# Patient Record
Sex: MALE | Race: WHITE | Employment: FULL TIME | URBAN - METROPOLITAN AREA
[De-identification: names, ages, dates, MRNs, and addresses within clinical notes are randomized per-mention and may not be internally consistent; named-entity substitution may affect disease eponyms.]

---

## 2017-01-02 ENCOUNTER — TELEPHONE (OUTPATIENT)
Dept: UROLOGY | Facility: CLINIC | Age: 32
End: 2017-01-02

## 2017-01-02 DIAGNOSIS — N41.1 PROSTATITIS, CHRONIC: Primary | ICD-10-CM

## 2017-01-02 RX ORDER — NITROFURANTOIN 25; 75 MG/1; MG/1
100 CAPSULE ORAL 2 TIMES DAILY
Qty: 84 CAPSULE | Refills: 0 | Status: SHIPPED | OUTPATIENT
Start: 2017-01-02 | End: 2017-02-13

## 2017-01-02 NOTE — TELEPHONE ENCOUNTER
Called patient to discuss semen culture results which indicate moderate growth of enterococcus. Limited susceptibilities to amoxicillin, ampicillin, gentamicin, vancomycin. Patient has a penicillin allergy listed and other options are not available PO. Per current antibiograms, enterococcus demonstrates 90% susceptibility to nitrofurantoin. As such, will treat with Macrobid 100 mg PO BID x 6 weeks to treat this chronic prostatitis infection. Rx sent to patient's pharmacy and he was notified via phone. Continues to complain of significant dysuria. He may continue to use Azo PRN but discussed that starting antibiotics promptly will provide the best relief. He verbalized understanding and all other questions were answered. Warning signs/ER precautions discussed. He will call with any worsening symptoms or symptoms that fail to improve after adequately targeted antibiotic therapy.  Teresa Nj PA-C  Urology

## 2017-03-23 ENCOUNTER — OFFICE VISIT (OUTPATIENT)
Dept: INTERNAL MEDICINE | Facility: CLINIC | Age: 32
End: 2017-03-23
Payer: COMMERCIAL

## 2017-03-23 VITALS
TEMPERATURE: 98.8 F | SYSTOLIC BLOOD PRESSURE: 112 MMHG | HEIGHT: 74 IN | HEART RATE: 68 BPM | DIASTOLIC BLOOD PRESSURE: 70 MMHG | BODY MASS INDEX: 22.19 KG/M2 | WEIGHT: 172.9 LBS | OXYGEN SATURATION: 100 %

## 2017-03-23 DIAGNOSIS — Z87.438 HISTORY OF PROSTATITIS: ICD-10-CM

## 2017-03-23 DIAGNOSIS — J06.9 UPPER RESPIRATORY TRACT INFECTION, UNSPECIFIED TYPE: Primary | ICD-10-CM

## 2017-03-23 DIAGNOSIS — H69.93 DYSFUNCTION OF EUSTACHIAN TUBE, BILATERAL: ICD-10-CM

## 2017-03-23 PROCEDURE — 99213 OFFICE O/P EST LOW 20 MIN: CPT | Performed by: FAMILY MEDICINE

## 2017-03-23 RX ORDER — CEPHALEXIN 500 MG/1
500 CAPSULE ORAL 3 TIMES DAILY
Qty: 21 CAPSULE | Refills: 0 | Status: SHIPPED | OUTPATIENT
Start: 2017-03-23 | End: 2017-03-31

## 2017-03-23 NOTE — PATIENT INSTRUCTIONS
Take prescribed medication as directed.    Try generic Claritin D, Allegra D or Zyrtec D. Ask Pharmacist for these.

## 2017-03-23 NOTE — MR AVS SNAPSHOT
After Visit Summary   3/23/2017    Abebe Elias    MRN: [id number]           Patient Information     Date Of Birth          1985        Visit Information        Provider Department      3/23/2017 8:00 AM Keyshawn Silverio MD [facility]        Today's Diagnoses     Upper respiratory tract infection, unspecified type    -  1    Dysfunction of eustachian tube, bilateral          Care Instructions      Take prescribed medication as directed.    Try generic Claritin D, Allegra D or Zyrtec D. Ask Pharmacist for these.          Follow-ups after your visit        Who to contact     If you have questions or need follow up information about today's clinic visit or your schedule please contact [facility] directly at [phone].  Normal or non-critical lab and imaging results will be communicated to you by [employer], letter or phone within 4 business days after the clinic has received the results. If you do not hear from us within 7 days, please contact the clinic through [employer] or phone. If you have a critical or abnormal lab result, we will notify you by phone as soon as possible.  Submit refill requests through [employer] or call your pharmacy and they will forward the refill request to us. Please allow 3 business days for your refill to be completed.          Additional Information About Your Visit        MyChart Information     [employer] gives you secure access to your electronic health record. If you see a primary care provider, you can also send messages to your care team and make appointments. If you have questions, please call your primary care clinic.  If you do not have a primary care provider, please call [phone] and they will assist you.        Care EveryWhere ID     This is your Care EveryWhere ID. This could be used by other organizations to access your [facility] medical records  [id number]        Your Vitals Were     Pulse Temperature Height Pulse  "Oximetry BMI (Body Mass Index)       68 98.8  F (37.1  C) (Oral) 6' 2\" (1.88 m) 100% 22.2 kg/m2        Blood Pressure from Last 3 Encounters:   03/23/17 112/70   12/27/16 124/76   11/11/16 100/60    Weight from Last 3 Encounters:   03/23/17 172 lb 14.4 oz (78.4 kg)   12/27/16 159 lb (72.1 kg)   11/11/16 160 lb (72.6 kg)              Today, you had the following     No orders found for display         Today's Medication Changes          These changes are accurate as of: 3/23/17  8:16 AM.  If you have any questions, ask your nurse or doctor.               Start taking these medicines.        Dose/Directions    cephALEXin 500 MG capsule   Commonly known as:  KEFLEX   Used for:  Upper respiratory tract infection, unspecified type   Started by:  Keyshawn Silverio MD        Dose:  500 mg   Take 1 capsule (500 mg) by mouth 3 times daily   Quantity:  21 capsule   Refills:  0            Where to get your medicines      These medications were sent to [facility] [id number] [place] [address]  AT [address] [address]     Phone:  [phone]     cephALEXin 500 MG capsule                Primary Care Provider Fax #    [facility] [phone]       No address on file        Thank you!     Thank you for choosing [facility]  for your care. Our goal is always to provide you with excellent care. Hearing back from our patients is one way we can continue to improve our services. Please take a few minutes to complete the written survey that you may receive in the mail after your visit with us. Thank you!             Your Updated Medication List - Protect others around you: Learn how to safely use, store and throw away your medicines at www.disposemymeds.org.          This list is accurate as of: 3/23/17  8:16 AM.  Always use your most recent med list.                   Brand Name Dispense Instructions for use    ADVIL PO          " cephALEXin 500 MG capsule    KEFLEX    21 capsule    Take 1 capsule (500 mg) by mouth 3 times daily

## 2017-03-23 NOTE — NURSING NOTE
"Chief Complaint   Patient presents with     Sinus Problem     Ear Problem       Initial /70 (BP Location: Left arm, Patient Position: Chair, Cuff Size: Adult Large)  Pulse 68  Temp 98.8  F (37.1  C) (Oral)  Ht 6' 2\" (1.88 m)  Wt 172 lb 14.4 oz (78.4 kg)  SpO2 100%  BMI 22.2 kg/m2 Estimated body mass index is 22.2 kg/(m^2) as calculated from the following:    Height as of this encounter: 6' 2\" (1.88 m).    Weight as of this encounter: 172 lb 14.4 oz (78.4 kg).  Medication Reconciliation: complete    "

## 2017-03-31 ENCOUNTER — OFFICE VISIT (OUTPATIENT)
Dept: UROLOGY | Facility: CLINIC | Age: 32
End: 2017-03-31
Payer: COMMERCIAL

## 2017-03-31 VITALS
WEIGHT: 170 LBS | DIASTOLIC BLOOD PRESSURE: 72 MMHG | HEIGHT: 74 IN | SYSTOLIC BLOOD PRESSURE: 122 MMHG | HEART RATE: 70 BPM | BODY MASS INDEX: 21.82 KG/M2

## 2017-03-31 DIAGNOSIS — R10.2 PERINEAL PAIN IN MALE: ICD-10-CM

## 2017-03-31 DIAGNOSIS — N41.0 ACUTE PROSTATITIS: ICD-10-CM

## 2017-03-31 DIAGNOSIS — R30.0 DYSURIA: Primary | ICD-10-CM

## 2017-03-31 DIAGNOSIS — Z87.438 HISTORY OF PROSTATITIS: ICD-10-CM

## 2017-03-31 LAB
ALBUMIN UR-MCNC: 30 MG/DL
APPEARANCE UR: CLEAR
BILIRUB UR QL STRIP: ABNORMAL
COLOR UR AUTO: YELLOW
GLUCOSE UR STRIP-MCNC: NEGATIVE MG/DL
HGB UR QL STRIP: NEGATIVE
KETONES UR STRIP-MCNC: NEGATIVE MG/DL
LEUKOCYTE ESTERASE UR QL STRIP: NEGATIVE
NITRATE UR QL: NEGATIVE
PH UR STRIP: 5.5 PH (ref 5–7)
SP GR UR STRIP: >1.03 (ref 1–1.03)
URN SPEC COLLECT METH UR: ABNORMAL
UROBILINOGEN UR STRIP-ACNC: 0.2 EU/DL (ref 0.2–1)

## 2017-03-31 PROCEDURE — 81003 URINALYSIS AUTO W/O SCOPE: CPT | Performed by: PHYSICIAN ASSISTANT

## 2017-03-31 PROCEDURE — 99213 OFFICE O/P EST LOW 20 MIN: CPT | Performed by: PHYSICIAN ASSISTANT

## 2017-03-31 RX ORDER — CIPROFLOXACIN 500 MG/1
500 TABLET, FILM COATED ORAL 2 TIMES DAILY
Qty: 60 TABLET | Refills: 1 | Status: SHIPPED | OUTPATIENT
Start: 2017-03-31 | End: 2017-04-30

## 2017-03-31 RX ORDER — PHENAZOPYRIDINE HYDROCHLORIDE 100 MG/1
100 TABLET, FILM COATED ORAL 3 TIMES DAILY PRN
Qty: 24 TABLET | Refills: 0 | Status: SHIPPED | OUTPATIENT
Start: 2017-03-31 | End: 2017-04-13

## 2017-03-31 RX ORDER — OXYCODONE AND ACETAMINOPHEN 5; 325 MG/1; MG/1
TABLET ORAL
Refills: 0 | COMMUNITY
Start: 2016-11-04 | End: 2017-03-31

## 2017-03-31 RX ORDER — OXYCODONE AND ACETAMINOPHEN 5; 325 MG/1; MG/1
1 TABLET ORAL EVERY 6 HOURS PRN
Qty: 15 TABLET | Refills: 0 | Status: SHIPPED | OUTPATIENT
Start: 2017-03-31 | End: 2018-10-08

## 2017-03-31 ASSESSMENT — PAIN SCALES - GENERAL: PAINLEVEL: SEVERE PAIN (6)

## 2017-03-31 NOTE — MR AVS SNAPSHOT
After Visit Summary   3/31/2017    Abebe Elias    MRN: [id number]           Patient Information     Date Of Birth          1985        Visit Information        Provider Department      3/31/2017 8:30 AM Teresa Henson PA-C [facility]        Today's Diagnoses     History of prostatitis    -  1    Acute prostatitis        Dysuria        Perineal pain in male           Follow-ups after your visit        Your next 10 appointments already scheduled     Apr 13, 2017  8:30 AM CDT   Cystoscopy with Krzysztof Martin MD, [facility] [place] ([facility])    [address]  [address]  [facility] [address] [phone]              Future tests that were ordered for you today     Open Future Orders        Priority Expected Expires Ordered    Genital Culture Aerobic Bacterial [[id number]] Routine  3/31/2018 3/31/2017            Who to contact     If you have questions or need follow up information about today's clinic visit or your schedule please contact [facility] directly at [phone].  Normal or non-critical lab and imaging results will be communicated to you by [employer], letter or phone within 4 business days after the clinic has received the results. If you do not hear from us within 7 days, please contact the clinic through [employer] or phone. If you have a critical or abnormal lab result, we will notify you by phone as soon as possible.  Submit refill requests through [employer] or call your pharmacy and they will forward the refill request to us. Please allow 3 business days for your refill to be completed.          Additional Information About Your Visit        [employer] Information     [employer] gives you secure access to your electronic health record. If you see a primary care provider, you can also send messages to your care team and make appointments.  "If you have questions, please call your primary care clinic.  If you do not have a primary care provider, please call [phone] and they will assist you.        Care EveryWhere ID     This is your Care EveryWhere ID. This could be used by other organizations to access your [facility] medical records  [id number]        Your Vitals Were     Pulse Height BMI (Body Mass Index)             70 1.88 m (6' 2\") 21.83 kg/m2          Blood Pressure from Last 3 Encounters:   03/31/17 122/72   03/23/17 112/70   12/27/16 124/76    Weight from Last 3 Encounters:   03/31/17 77.1 kg (170 lb)   03/23/17 78.4 kg (172 lb 14.4 oz)   12/27/16 72.1 kg (159 lb)              We Performed the Following     UA without Microscopic          Today's Medication Changes          These changes are accurate as of: 3/31/17  9:09 AM.  If you have any questions, ask your nurse or doctor.               Start taking these medicines.        Dose/Directions    ciprofloxacin 500 MG tablet   Commonly known as:  CIPRO   Used for:  Acute prostatitis   Started by:  Teresa Henson PA-C        Dose:  500 mg   Take 1 tablet (500 mg) by mouth 2 times daily   Quantity:  60 tablet   Refills:  1       oxyCODONE-acetaminophen 5-325 MG per tablet   Commonly known as:  PERCOCET   Used for:  Perineal pain in male   Started by:  Teresa Henson PA-C        Dose:  1 tablet   Take 1 tablet by mouth every 6 hours as needed for moderate to severe pain   Quantity:  15 tablet   Refills:  0       phenazopyridine 100 MG tablet   Commonly known as:  PYRIDIUM   Used for:  Dysuria   Started by:  Teresa Henson PA-C        Dose:  100 mg   Take 1 tablet (100 mg) by mouth 3 times daily as needed for urinary tract discomfort   Quantity:  24 tablet   Refills:  0            Where to get your medicines      These medications were sent to [facility] [address] [place], [place] - [address] AT [facility] [address]  [address], [facility] " [address]     Phone:  [phone]     ciprofloxacin 500 MG tablet    phenazopyridine 100 MG tablet         Some of these will need a paper prescription and others can be bought over the counter.  Ask your nurse if you have questions.     Bring a paper prescription for each of these medications     oxyCODONE-acetaminophen 5-325 MG per tablet                Primary Care Provider Fax #    Yifan Candelario [phone]       No address on file        Thank you!     Thank you for choosing [facility] [place]  for your care. Our goal is always to provide you with excellent care. Hearing back from our patients is one way we can continue to improve our services. Please take a few minutes to complete the written survey that you may receive in the mail after your visit with us. Thank you!             Your Updated Medication List - Protect others around you: Learn how to safely use, store and throw away your medicines at www.disposemymeds.org.          This list is accurate as of: 3/31/17  9:09 AM.  Always use your most recent med list.                   Brand Name Dispense Instructions for use    ADVIL PO          ciprofloxacin 500 MG tablet    CIPRO    60 tablet    Take 1 tablet (500 mg) by mouth 2 times daily       oxyCODONE-acetaminophen 5-325 MG per tablet    PERCOCET    15 tablet    Take 1 tablet by mouth every 6 hours as needed for moderate to severe pain       phenazopyridine 100 MG tablet    PYRIDIUM    24 tablet    Take 1 tablet (100 mg) by mouth 3 times daily as needed for urinary tract discomfort

## 2017-03-31 NOTE — PROGRESS NOTES
"REASON FOR VISIT: follow up dysuria, scrotal pain     HPI: Mr. Abebe Elias is a 31 year old male seen today in the urology clinic for follow up regarding dysuria and scrotal pain. The patient initially saw Dr. Martin in March 2016 at which time he was diagnosed with prostatitis and treated with 4 weeks of ciprofloxacin. Patient notes that symptoms improved somewhat but never fully resolved. He has eliminated caffeine, juice, and acidic foods/drinks from his diet without much change. Since then, the patient has been seen multiple times (both in the clinic and ER) for ongoing dysuria, pelvic pain, and rectal pain. Has been treated with multiple courses of antibiotics including Rocephin IM, Doxycycline, Cipro, Bactrim, and Macrobid. He notes that symptoms improve somewhat while on antibiotics but then recur within days to weeks after stopping them. Has had multiple negative imaging studies as well including CT abdomen/pelvis and scrotal ultrasound. Recent semen culture did reveal moderate growth of Enterococcus species. Most recently treated with Macrobid and reports symptoms improved significantly for a while, but 2 weeks ago they recurred after he had some dental work done.     Currently complains of severe dysuria, frequency, urgency, perineal pain/pressure, pain in the lower abdomen, and bilateral testicular pain. Symptoms seem to worsen after intercourse. Denies fevers, chills, N/V. Denies gross hematuria, hematospermia, pyuria, or penile discharge. Takes ibuprofen regularly but has been taking Norco to help him sleep at night as the pain is so severe at times.     PHYSICAL EXAM:    /72 (BP Location: Left arm, Patient Position: Chair, Cuff Size: Adult Regular)  Pulse 70  Ht 1.88 m (6' 2\")  Wt 77.1 kg (170 lb)  BMI 21.83 kg/m2  GENERAL: Well groomed, well developed, well nourished male in NAD.  HEENT: EOMI, AT, NC.  SKIN: Warm to touch, dry.  No visible rashes or lesions on examined areas.  RESP: " No increased respiratory effort.    MS: Full ROM in extremities.  : Deferred - see exam section from previous office visit.   SANTO: Deferred - see exam section from previous office visit.   NEURO: Alert and oriented x 3.  PSYCH: Normal mood and affect, pleasant and agreeable during interview and exam.     IMAGING:    TESTICULAR ULTRASOUND December 27, 2016 12:15 PM   FINDINGS: A few scattered calcifications are noted in each testicle.  There is otherwise homogeneous normal echotexture throughout the  bilateral testes. The left testicle measures 4.5 x 2.7 x 2.2 cm. The  right testicle measures 4.8 x 2.8 x 2.5 cm. Both the right and left  epididymides are unremarkable. Doppler evaluation shows normal  arterial waveforms to the testes bilaterally. There is no hydrocele.  There is no varicocele. There is no mass or abnormal calcifications.  IMPRESSION: Negative study.     Urine dipstick today reveals 30 protein, small bilirubin, o/w wnl.     Component Results   Component Collected Lab   Specimen Description 12/29/2016  8:00 AM [facility]   Semen   Culture Micro (Abnormal) 12/29/2016  8:00 AM 75   Moderate growth Enterococcus species   Moderate growth Normal urogenital esvin      Micro Report Status 12/29/2016  8:00 AM 75   FINAL 01/01/2017        ASSESSMENT/PLAN:  Mr. Abebe Elias is a very pleasant 31 year old male with cyclic episodes of severe dysuria, perineal pain/pressure, testicular pain, frequency/urgency - ongoing for 1 year. Has had extensive workup including scrotal ultrasound x 2, CT A/P, STD screening, UA/UC, and semen culture which did reveal moderate growth enterococcus. His symptoms improve temporarily with antibiotics but always recur after completion - has been treated multiple times for suspected prostatitis versus urethritis. Given the recurrent nature and the severity of his symptoms, would recommend proceeding with additional evaluation to include the following:  -Recheck semen culture to confirm  continued presence or eradication of enterococcus.  -Cystoscopy with Dr. Martin who the patient last saw 4/2016 to rule out urethral (stricture) or intravesical pathology.   -Will also start on another course of Cipro 500 mg BID x 4 weeks as he does improve with antibiotics. Await semen culture results and adjust antibiotic coverage as needed.  -Take a daily probiotic to prevent disruption to healthy GI esvin.  -Rx for short courses of Pyridium and Norco (#15) provided. Use sparingly and only as needed.   -Continue to avoid bladder irritants, drink plenty of water, and avoid aggravating activities.     Follow up for cystoscopy as scheduled. Call or RTC sooner with any issues. Warning signs and ER precautions discussed including fevers, chills, N/V, severe, refractory pain, penile discharge, etc. Patient verbalized his understanding and agreement.      I have enjoyed participating in the medical care of this very pleasant patient.  Please don't hesitate to contact me with any questions or concerns.      SYDNEY BarreraC  Urology

## 2017-03-31 NOTE — LETTER
3/31/2017       RE: Abebe Elias  [address]  [facility] [address]     Dear Colleague,    Thank you for referring your patient, Abebe Elias, to the [facility] [place] at [facility]. Please see a copy of my visit note below.    REASON FOR VISIT: follow up dysuria, scrotal pain     HPI: Mr. Abebe Elias is a 31 year old male seen today in the urology clinic for follow up regarding dysuria and scrotal pain. The patient initially saw Dr. Martin in March 2016 at which time he was diagnosed with prostatitis and treated with 4 weeks of ciprofloxacin. Patient notes that symptoms improved somewhat but never fully resolved. He has eliminated caffeine, juice, and acidic foods/drinks from his diet without much change. Since then, the patient has been seen multiple times (both in the clinic and ER) for ongoing dysuria, pelvic pain, and rectal pain. Has been treated with multiple courses of antibiotics including Rocephin IM, Doxycycline, Cipro, Bactrim, and Macrobid. He notes that symptoms improve somewhat while on antibiotics but then recur within days to weeks after stopping them. Has had multiple negative imaging studies as well including CT abdomen/pelvis and scrotal ultrasound. Recent semen culture did reveal moderate growth of Enterococcus species. Most recently treated with Macrobid and reports symptoms improved significantly for a while, but 2 weeks ago they recurred after he had some dental work done.     Currently complains of severe dysuria, frequency, urgency, perineal pain/pressure, pain in the lower abdomen, and bilateral testicular pain. Symptoms seem to worsen after intercourse. Denies fevers, chills, N/V. Denies gross hematuria, hematospermia, pyuria, or penile discharge. Takes ibuprofen regularly but has been taking Norco to help him sleep at night as the pain is so severe at times.     PHYSICAL EXAM:    /72 (BP  "Location: Left arm, Patient Position: Chair, Cuff Size: Adult Regular)  Pulse 70  Ht 1.88 m (6' 2\")  Wt 77.1 kg (170 lb)  BMI 21.83 kg/m2  GENERAL: Well groomed, well developed, well nourished male in NAD.  HEENT: EOMI, AT, NC.  SKIN: Warm to touch, dry.  No visible rashes or lesions on examined areas.  RESP: No increased respiratory effort.    MS: Full ROM in extremities.  : Deferred - see exam section from previous office visit.   SANTO: Deferred - see exam section from previous office visit.   NEURO: Alert and oriented x 3.  PSYCH: Normal mood and affect, pleasant and agreeable during interview and exam.     IMAGING:    TESTICULAR ULTRASOUND December 27, 2016 12:15 PM   FINDINGS: A few scattered calcifications are noted in each testicle.  There is otherwise homogeneous normal echotexture throughout the  bilateral testes. The left testicle measures 4.5 x 2.7 x 2.2 cm. The  right testicle measures 4.8 x 2.8 x 2.5 cm. Both the right and left  epididymides are unremarkable. Doppler evaluation shows normal  arterial waveforms to the testes bilaterally. There is no hydrocele.  There is no varicocele. There is no mass or abnormal calcifications.  IMPRESSION: Negative study.     Urine dipstick today reveals 30 protein, small bilirubin, o/w wnl.     Component Results   Component Collected Lab   Specimen Description 12/29/2016  8:00 AM [facility]   Semen   Culture Micro (Abnormal) 12/29/2016  8:00 AM 75   Moderate growth Enterococcus species   Moderate growth Normal urogenital esvin      Micro Report Status 12/29/2016  8:00 AM 75   FINAL 01/01/2017        ASSESSMENT/PLAN:  Mr. Abebe Elias is a very pleasant 31 year old male with cyclic episodes of severe dysuria, perineal pain/pressure, testicular pain, frequency/urgency - ongoing for 1 year. Has had extensive workup including scrotal ultrasound x 2, CT A/P, STD screening, UA/UC, and semen culture which did reveal moderate growth enterococcus. His symptoms improve " temporarily with antibiotics but always recur after completion - has been treated multiple times for suspected prostatitis versus urethritis. Given the recurrent nature and the severity of his symptoms, would recommend proceeding with additional evaluation to include the following:  -Recheck semen culture to confirm continued presence or eradication of enterococcus.  -Cystoscopy with Dr. Martin who the patient last saw 4/2016 to rule out urethral (stricture) or intravesical pathology.   -Will also start on another course of Cipro 500 mg BID x 4 weeks as he does improve with antibiotics. Await semen culture results and adjust antibiotic coverage as needed.  -Take a daily probiotic to prevent disruption to healthy GI esvin.  -Rx for short courses of Pyridium and Norco (#15) provided. Use sparingly and only as needed.   -Continue to avoid bladder irritants, drink plenty of water, and avoid aggravating activities.     Follow up for cystoscopy as scheduled. Call or RTC sooner with any issues. Warning signs and ER precautions discussed including fevers, chills, N/V, severe, refractory pain, penile discharge, etc. Patient verbalized his understanding and agreement.      I have enjoyed participating in the medical care of this very pleasant patient.  Please don't hesitate to contact me with any questions or concerns.      Teresa Henson PA-C  Urology

## 2017-04-12 DIAGNOSIS — Z87.438 HISTORY OF PROSTATITIS: Primary | ICD-10-CM

## 2017-04-13 ENCOUNTER — OFFICE VISIT (OUTPATIENT)
Dept: UROLOGY | Facility: CLINIC | Age: 32
End: 2017-04-13
Payer: COMMERCIAL

## 2017-04-13 VITALS
HEART RATE: 60 BPM | SYSTOLIC BLOOD PRESSURE: 118 MMHG | DIASTOLIC BLOOD PRESSURE: 68 MMHG | HEIGHT: 74 IN | BODY MASS INDEX: 22.07 KG/M2 | WEIGHT: 172 LBS

## 2017-04-13 DIAGNOSIS — Z87.438 HISTORY OF PROSTATITIS: ICD-10-CM

## 2017-04-13 LAB
ALBUMIN UR-MCNC: NEGATIVE MG/DL
APPEARANCE UR: CLEAR
BILIRUB UR QL STRIP: NEGATIVE
COLOR UR AUTO: YELLOW
GLUCOSE UR STRIP-MCNC: NEGATIVE MG/DL
HGB UR QL STRIP: NEGATIVE
KETONES UR STRIP-MCNC: NEGATIVE MG/DL
LEUKOCYTE ESTERASE UR QL STRIP: NEGATIVE
NITRATE UR QL: NEGATIVE
PH UR STRIP: 7 PH (ref 5–7)
SP GR UR STRIP: 1.02 (ref 1–1.03)
URN SPEC COLLECT METH UR: NORMAL
UROBILINOGEN UR STRIP-ACNC: 1 EU/DL (ref 0.2–1)

## 2017-04-13 PROCEDURE — 81003 URINALYSIS AUTO W/O SCOPE: CPT | Performed by: UROLOGY

## 2017-04-13 PROCEDURE — 99212 OFFICE O/P EST SF 10 MIN: CPT | Mod: 25 | Performed by: UROLOGY

## 2017-04-13 PROCEDURE — 52000 CYSTOURETHROSCOPY: CPT | Performed by: UROLOGY

## 2017-04-13 RX ORDER — DOXYCYCLINE 100 MG/1
100 TABLET ORAL 2 TIMES DAILY
Qty: 168 TABLET | Refills: 0 | Status: SHIPPED | OUTPATIENT
Start: 2017-04-13 | End: 2017-07-06

## 2017-04-13 ASSESSMENT — PAIN SCALES - GENERAL: PAINLEVEL: MODERATE PAIN (4)

## 2017-04-13 NOTE — MR AVS SNAPSHOT
"              After Visit Summary   4/13/2017    Abebe Elias    MRN: [id number]           Patient Information     Date Of Birth          1985        Visit Information        Provider Department      4/13/2017 8:30 AM Krzysztof Martin MD; [facility] [place]        Today's Diagnoses     History of prostatitis          Care Instructions         AFTER YOUR CYSTOSCOPY         You have just completed a cystoscopy, or \"cysto\", which allowed your physician to learn more about your bladder (or to remove a stent placed after surgery). We suggest that you continue to avoid caffeine, fruit juice, and alcohol for the next 24 hours, however, you are encouraged to return to your normal activities.       A few things that are considered normal after your cystoscopy:    * small amount of bleeding (or spotting) that clears within the next 24 hours    * slight burning sensation with urination    * sensation to of needing to avoid more frequently    * the feeling of \"air\" in your urine    * mild discomfort that is relieved with Tylonol        Please contact our office promptly if you:    * develop a fever above 101 degrees    * are unable to urinate    * develop bright red blood that does not stop    * severe pain or swelling        And of course, please contact our office with any concerns or questions [phone]            Follow-ups after your visit        Who to contact     If you have questions or need follow up information about today's clinic visit or your schedule please contact [facility] [place] directly at [phone].  Normal or non-critical lab and imaging results will be communicated to you by MyChart, letter or phone within 4 business days after the clinic has received the results. If you do not hear from us within 7 days, please contact the clinic through MyChart or phone. If you have a critical or abnormal lab result, we will " "notify you by phone as soon as possible.  Submit refill requests through [employer] or call your pharmacy and they will forward the refill request to us. Please allow 3 business days for your refill to be completed.          Additional Information About Your Visit        [employer] Information     [employer] gives you secure access to your electronic health record. If you see a primary care provider, you can also send messages to your care team and make appointments. If you have questions, please call your primary care clinic.  If you do not have a primary care provider, please call [phone] and they will assist you.        Care EveryWhere ID     This is your Care EveryWhere ID. This could be used by other organizations to access your [place] medical records  [id number]        Your Vitals Were     Pulse Height BMI (Body Mass Index)             60 1.88 m (6' 2\") 22.08 kg/m2          Blood Pressure from Last 3 Encounters:   04/13/17 118/68   03/31/17 122/72   03/23/17 112/70    Weight from Last 3 Encounters:   04/13/17 78 kg (172 lb)   03/31/17 77.1 kg (170 lb)   03/23/17 78.4 kg (172 lb 14.4 oz)              We Performed the Following     UA without Microscopic        Primary Care Provider Fax #    [facility] [phone]       No address on file        Thank you!     Thank you for choosing [facility] [place]  for your care. Our goal is always to provide you with excellent care. Hearing back from our patients is one way we can continue to improve our services. Please take a few minutes to complete the written survey that you may receive in the mail after your visit with us. Thank you!             Your Updated Medication List - Protect others around you: Learn how to safely use, store and throw away your medicines at www.disposemymeds.org.          This list is accurate as of: 4/13/17  9:34 AM.  Always use your most recent med list.                   Brand Name Dispense " Instructions for use    ADVIL PO          ciprofloxacin 500 MG tablet    CIPRO    60 tablet    Take 1 tablet (500 mg) by mouth 2 times daily       oxyCODONE-acetaminophen 5-325 MG per tablet    PERCOCET    15 tablet    Take 1 tablet by mouth every 6 hours as needed for moderate to severe pain

## 2017-04-13 NOTE — PROGRESS NOTES
History: This very pleasant 31-year-old gentleman returns today.  He has a history of recurrent prostatitis with epididymitis and currently is on Cipro 500 mg twice a day for recurrent symptoms.  Semen culture did show a moderate growth of enterococcus about 3 months ago sensitive to penicillin that he is allergic to penicillin.  The main symptom of present is discomfort in the scrotum.  He was seen by our nurse practitioner in the recent past was concerned that he may require an additional cystoscopy.  He is getting some discomfort on voiding the present time.    Procedure.  Cystoscopy  Surgeon.    Veronica  Anesthesia.  Local anesthesia  Prescription in.  With the patient in the supine position, and with the genital area prepped and draped in the customary fashion, the flexible cystoscope was carefully inserted into the urethra.  The patient does have a distal hypospadias but the urethra was otherwise unremarkable.  The external sphincter is intact.  The prostatic urethra is not enlarged though it is quite sensitive.  The interior of the bladder shows no evidence of trabeculation, neoplasm or stone formation.  There were no other remarkable features.    Impression.  He has symptoms of chronic prostatitis and cystoscopy shows no additional remarkable features.  I would recommend out as he take doxycycline 100 mg twice a day for 12 weeks in order to try and ascertain a reasonable level of antibiotic in the prostate fluid.  In addition I reiterated the importance of avoidance of caffeine, cranberry juice etc. abstinence from alcohol, avoidance of spicy foods, regular warm baths to relax the pelvic floor etc.  Adequate careful discussion in detail with him about all of these issues.  I answered all his questions.    Plan.  I would recommend I see him if the symptoms do not resolve.    Time.  Tenderness is present in addition to cystoscopy in order to review records, discussed the situation following the procedure and  "talked about other measures that should be utilized to try and eliminate symptoms.    \"This dictation was performed with voice recognition software and may contain errors,  omissions and inadvertent word substitution.\"      "

## 2017-04-13 NOTE — PATIENT INSTRUCTIONS
"     AFTER YOUR CYSTOSCOPY         You have just completed a cystoscopy, or \"cysto\", which allowed your physician to learn more about your bladder (or to remove a stent placed after surgery). We suggest that you continue to avoid caffeine, fruit juice, and alcohol for the next 24 hours, however, you are encouraged to return to your normal activities.       A few things that are considered normal after your cystoscopy:    * small amount of bleeding (or spotting) that clears within the next 24 hours    * slight burning sensation with urination    * sensation to of needing to avoid more frequently    * the feeling of \"air\" in your urine    * mild discomfort that is relieved with Tylonol        Please contact our office promptly if you:    * develop a fever above 101 degrees    * are unable to urinate    * develop bright red blood that does not stop    * severe pain or swelling        And of course, please contact our office with any concerns or questions [phone]      "

## 2017-04-13 NOTE — NURSING NOTE
Chief Complaint   Patient presents with     Cystoscopy     dysuria     Emily Chicas LPN 9:00 AM April 13, 2017    Prior to the start of the procedure and with procedural staff participation, I verbally confirmed the patient s identity using two indicators, relevant allergies, that the procedure was appropriate and matched the consent or emergent situation, and that the correct equipment/implants were available. Immediately prior to starting the procedure I conducted the Time Out with the procedural staff and re-confirmed the patient s name, procedure, and site/side. I have wiped the patient off with the povidone-Iodine solution, draped them,  used Lidocaine hydrochloride jelly, and instilled sterile water into the bladder. (The Joint Commission universal protocol was followed.)  Yes    Sedation (Moderate or Deep): None    Emily Chicas LPN 9:00 AM April 13, 2017

## 2017-04-13 NOTE — LETTER
4/13/2017       RE: Abebe Elias  [address]  [facility] [address]     Dear Colleague,    Thank you for referring your patient, Abebe Elias, to the [facility] [place] at [facility]. Please see a copy of my visit note below.    History: This very pleasant 31-year-old gentleman returns today.  He has a history of recurrent prostatitis with epididymitis and currently is on Cipro 500 mg twice a day for recurrent symptoms.  Semen culture did show a moderate growth of enterococcus about 3 months ago sensitive to penicillin that he is allergic to penicillin.  The main symptom of present is discomfort in the scrotum.  He was seen by our nurse practitioner in the recent past was concerned that he may require an additional cystoscopy.  He is getting some discomfort on voiding the present time.    Procedure.  Cystoscopy  Surgeon.    Veronica  Anesthesia.  Local anesthesia  Prescription in.  With the patient in the supine position, and with the genital area prepped and draped in the customary fashion, the flexible cystoscope was carefully inserted into the urethra.  The patient does have a distal hypospadias but the urethra was otherwise unremarkable.  The external sphincter is intact.  The prostatic urethra is not enlarged though it is quite sensitive.  The interior of the bladder shows no evidence of trabeculation, neoplasm or stone formation.  There were no other remarkable features.    Impression.  He has symptoms of chronic prostatitis and cystoscopy shows no additional remarkable features.  I would recommend out as he take doxycycline 100 mg twice a day for 12 weeks in order to try and ascertain a reasonable level of antibiotic in the prostate fluid.  In addition I reiterated the importance of avoidance of caffeine, cranberry juice etc. abstinence from alcohol, avoidance of spicy foods, regular warm baths to relax the pelvic floor  "etc.  Adequate careful discussion in detail with him about all of these issues.  I answered all his questions.    Plan.  I would recommend I see him if the symptoms do not resolve.    Time.  Tenderness is present in addition to cystoscopy in order to review records, discussed the situation following the procedure and talked about other measures that should be utilized to try and eliminate symptoms.    \"This dictation was performed with voice recognition software and may contain errors,  omissions and inadvertent word substitution.\"    Again, thank you for allowing me to participate in the care of your patient.      Sincerely,    Krzysztof Martin MD      "

## 2018-10-08 ENCOUNTER — APPOINTMENT (OUTPATIENT)
Dept: CT IMAGING | Facility: CLINIC | Age: 33
End: 2018-10-08
Attending: EMERGENCY MEDICINE
Payer: COMMERCIAL

## 2018-10-08 ENCOUNTER — TRANSFERRED RECORDS (OUTPATIENT)
Dept: HEALTH INFORMATION MANAGEMENT | Facility: CLINIC | Age: 33
End: 2018-10-08

## 2018-10-08 ENCOUNTER — APPOINTMENT (OUTPATIENT)
Dept: GENERAL RADIOLOGY | Facility: CLINIC | Age: 33
End: 2018-10-08
Attending: EMERGENCY MEDICINE
Payer: COMMERCIAL

## 2018-10-08 ENCOUNTER — HOSPITAL ENCOUNTER (OUTPATIENT)
Facility: CLINIC | Age: 33
Setting detail: OBSERVATION
Discharge: HOME | End: 2018-10-09
Attending: EMERGENCY MEDICINE | Admitting: HOSPITALIST
Payer: COMMERCIAL

## 2018-10-08 DIAGNOSIS — R00.1 BRADYCARDIA: ICD-10-CM

## 2018-10-08 PROBLEM — R55 SYNCOPE: Status: ACTIVE | Noted: 2018-10-08

## 2018-10-08 LAB
ALBUMIN UR-MCNC: NEGATIVE MG/DL
APPEARANCE UR: CLEAR
BILIRUB UR QL STRIP: NEGATIVE
COLOR UR AUTO: YELLOW
D DIMER PPP FEU-MCNC: <0.3 UG/ML FEU (ref 0–0.5)
GLUCOSE UR STRIP-MCNC: NEGATIVE MG/DL
HGB UR QL STRIP: NEGATIVE
KETONES UR STRIP-MCNC: NEGATIVE MG/DL
LEUKOCYTE ESTERASE UR QL STRIP: NEGATIVE
MUCOUS THREADS #/AREA URNS LPF: PRESENT /LPF
NITRATE UR QL: NEGATIVE
PH UR STRIP: 7 PH (ref 5–7)
RBC #/AREA URNS AUTO: 1 /HPF (ref 0–2)
SOURCE: ABNORMAL
SP GR UR STRIP: 1.03 (ref 1–1.03)
SQUAMOUS #/AREA URNS AUTO: <1 /HPF (ref 0–1)
TSH SERPL DL<=0.005 MIU/L-ACNC: 1.42 MU/L (ref 0.4–4)
UROBILINOGEN UR STRIP-MCNC: 4 MG/DL (ref 0–2)
WBC #/AREA URNS AUTO: 1 /HPF (ref 0–5)

## 2018-10-08 PROCEDURE — 93005 ELECTROCARDIOGRAM TRACING: CPT

## 2018-10-08 PROCEDURE — 71046 X-RAY EXAM CHEST 2 VIEWS: CPT

## 2018-10-08 PROCEDURE — 70450 CT HEAD/BRAIN W/O DYE: CPT

## 2018-10-08 PROCEDURE — 25000128 H RX IP 250 OP 636: Performed by: EMERGENCY MEDICINE

## 2018-10-08 PROCEDURE — 80307 DRUG TEST PRSMV CHEM ANLYZR: CPT | Performed by: PHYSICIAN ASSISTANT

## 2018-10-08 PROCEDURE — 99285 EMERGENCY DEPT VISIT HI MDM: CPT | Mod: 25

## 2018-10-08 PROCEDURE — 76604 US EXAM CHEST: CPT

## 2018-10-08 PROCEDURE — 99219 ZZC INITIAL OBSERVATION CARE,LEVL II: CPT | Performed by: PHYSICIAN ASSISTANT

## 2018-10-08 PROCEDURE — 25000128 H RX IP 250 OP 636: Performed by: PHYSICIAN ASSISTANT

## 2018-10-08 PROCEDURE — 96361 HYDRATE IV INFUSION ADD-ON: CPT

## 2018-10-08 PROCEDURE — 40000358 ZZHCL STATISTIC DRUG SCREEN MULTIPLE (METRO): Performed by: PHYSICIAN ASSISTANT

## 2018-10-08 PROCEDURE — 93005 ELECTROCARDIOGRAM TRACING: CPT | Mod: 76

## 2018-10-08 PROCEDURE — G0378 HOSPITAL OBSERVATION PER HR: HCPCS

## 2018-10-08 PROCEDURE — 84443 ASSAY THYROID STIM HORMONE: CPT | Performed by: EMERGENCY MEDICINE

## 2018-10-08 PROCEDURE — 96360 HYDRATION IV INFUSION INIT: CPT

## 2018-10-08 PROCEDURE — 85379 FIBRIN DEGRADATION QUANT: CPT | Performed by: EMERGENCY MEDICINE

## 2018-10-08 PROCEDURE — 81001 URINALYSIS AUTO W/SCOPE: CPT | Performed by: PHYSICIAN ASSISTANT

## 2018-10-08 RX ORDER — POLYETHYLENE GLYCOL 3350 17 G/17G
17 POWDER, FOR SOLUTION ORAL DAILY PRN
Status: DISCONTINUED | OUTPATIENT
Start: 2018-10-08 | End: 2018-10-09 | Stop reason: HOSPADM

## 2018-10-08 RX ORDER — ONDANSETRON 2 MG/ML
4 INJECTION INTRAMUSCULAR; INTRAVENOUS EVERY 6 HOURS PRN
Status: DISCONTINUED | OUTPATIENT
Start: 2018-10-08 | End: 2018-10-09 | Stop reason: HOSPADM

## 2018-10-08 RX ORDER — ONDANSETRON 4 MG/1
4 TABLET, ORALLY DISINTEGRATING ORAL EVERY 6 HOURS PRN
Status: DISCONTINUED | OUTPATIENT
Start: 2018-10-08 | End: 2018-10-09 | Stop reason: HOSPADM

## 2018-10-08 RX ORDER — SODIUM CHLORIDE 9 MG/ML
INJECTION, SOLUTION INTRAVENOUS CONTINUOUS
Status: DISCONTINUED | OUTPATIENT
Start: 2018-10-08 | End: 2018-10-09 | Stop reason: HOSPADM

## 2018-10-08 RX ORDER — LIDOCAINE 40 MG/G
CREAM TOPICAL
Status: DISCONTINUED | OUTPATIENT
Start: 2018-10-08 | End: 2018-10-09 | Stop reason: HOSPADM

## 2018-10-08 RX ORDER — NALOXONE HYDROCHLORIDE 0.4 MG/ML
.1-.4 INJECTION, SOLUTION INTRAMUSCULAR; INTRAVENOUS; SUBCUTANEOUS
Status: DISCONTINUED | OUTPATIENT
Start: 2018-10-08 | End: 2018-10-09 | Stop reason: HOSPADM

## 2018-10-08 RX ORDER — AMOXICILLIN 250 MG
2 CAPSULE ORAL 2 TIMES DAILY PRN
Status: DISCONTINUED | OUTPATIENT
Start: 2018-10-08 | End: 2018-10-09 | Stop reason: HOSPADM

## 2018-10-08 RX ORDER — AMOXICILLIN 250 MG
1 CAPSULE ORAL 2 TIMES DAILY PRN
Status: DISCONTINUED | OUTPATIENT
Start: 2018-10-08 | End: 2018-10-09 | Stop reason: HOSPADM

## 2018-10-08 RX ORDER — ACETAMINOPHEN 325 MG/1
650 TABLET ORAL EVERY 4 HOURS PRN
Status: DISCONTINUED | OUTPATIENT
Start: 2018-10-08 | End: 2018-10-09 | Stop reason: HOSPADM

## 2018-10-08 RX ADMIN — SODIUM CHLORIDE, PRESERVATIVE FREE: 5 INJECTION INTRAVENOUS at 21:49

## 2018-10-08 RX ADMIN — SODIUM CHLORIDE 1000 ML: 9 INJECTION, SOLUTION INTRAVENOUS at 17:26

## 2018-10-08 ASSESSMENT — ENCOUNTER SYMPTOMS
CHEST TIGHTNESS: 0
ACTIVITY CHANGE: 0
DIZZINESS: 0
WEAKNESS: 0
NUMBNESS: 0
SHORTNESS OF BREATH: 0
FEVER: 0
HEADACHES: 0
LIGHT-HEADEDNESS: 1

## 2018-10-08 NOTE — ED NOTES
"[facility]  ED Nurse Handoff Report    Abebe Elias is a 33 year old male   ED Chief complaint: Loss of Consciousness  . ED Diagnosis:   Final diagnoses:   Bradycardia     Allergies:   Allergies   Allergen Reactions     Hydrocodone Itching     Also intense itching.     Penicillins      Pseudoephedrine      Vicodin [Hydrocodone-Acetaminophen]        Code Status: Full Code  Activity level - Baseline/Home:  Independent. Activity Level - Current:   Independent. Lift room needed: No. Bariatric: No   Needed: No   Isolation: No. Infection: Not Applicable.     Vital Signs:   Vitals:    10/08/18 1258 10/08/18 1500   BP: 120/87 124/63   Pulse: 65    Resp: 18    Temp: 98.2  F (36.8  C)    TempSrc: Temporal    SpO2: 99%    Weight: 72.6 kg (160 lb)    Height: 1.88 m (6' 2\")        Cardiac Rhythm:  ,      Pain level: 0-10 Pain Scale: 0  Patient confused: No. Patient Falls Risk: No.   Elimination Status: Has voided   Patient Report - Initial Complaint: dizziness. Focused Assessment: Patient noted to have positive orthostatics when going from sitting to standing. Noted to have dizziness with episodes of syncope at home.   Tests Performed: labs, xray, ct-scan. Abnormal Results:   Labs Ordered and Resulted from Time of ED Arrival Up to the Time of Departure from the ED   D DIMER QUANTITATIVE   TSH WITH FREE T4 REFLEX   ORTHOSTATIC BLOOD PRESSURE AND PULSE   CARDIAC CONTINUOUS MONITORING     Head CT w/o contrast   Preliminary Result   IMPRESSION:  Normal head CT.      Radiation dose for this scan was reduced using automated exposure   control, adjustment of the mA and/or kV according to patient size, or   iterative reconstruction technique.         XR Chest 2 Views   Final Result   IMPRESSION: Cardiac silhouette and pulmonary vasculature are within   normal limits. No focal airspace disease, pleural effusion or   pneumothorax.      CLAUDIA ADEN MD      POC US ECHO LIMITED    (Results Pending)   . "   Treatments provided: IV fluids, monitoring, tele  Family Comments: wife supportive at bedside  OBS brochure/video discussed/provided to patient:  Yes  ED Medications:   Medications   0.9% sodium chloride BOLUS (1,000 mLs Intravenous New Bag 10/8/18 8295)     Drips infusing:  Yes  For the majority of the shift, the patient's behavior Green. Interventions performed were NA.     Severe Sepsis OR Septic Shock Diagnosis Present: No      ED Nurse Name/Phone Number: Bridgett Serrato,   6:31 PM    RECEIVING UNIT ED HANDOFF REVIEW    Above ED Nurse Handoff Report was reviewed: Yes  Reviewed by: Jessica Duvall on October 8, 2018 at 7:12 PM

## 2018-10-08 NOTE — IP AVS SNAPSHOT
"    [facility] OBSERVATION DEPARTMENT: [phone]                                              INTERAGENCY TRANSFER FORM - LAB / IMAGING / EKG / EMG RESULTS   10/8/2018                    Hospital Admission Date: 10/8/2018  ROSMERY BAKER   : 1985  Sex: Male        Attending Provider: Ector Ribeiro MD     Allergies:  Hydrocodone, Penicillins, Pseudoephedrine, Vicodin [Hydrocodone-acetaminophen]    Infection:  None   Service:  Observation    Ht:  1.88 m (6' 2\")   Wt:  72.6 kg (160 lb)   Admission Wt:  72.6 kg (160 lb)    BMI:  20.54 kg/m 2   BSA:  1.95 m 2            Patient PCP Information     Provider PCP Type    Physician No Ref-Primary General         Lab Results - 3 Days      Basic metabolic panel [[id number]] (Abnormal)  Resulted: 10/09/18 06, Result status: Final result    Ordering provider: Aby Blair PA-C  10/09/18 0001 Resulting lab: [facility]    Specimen Information    Type Source Collected On   Blood  10/09/18 0521          Components       Value Reference Range Flag Lab   Sodium 143 133 - 144 mmol/L  FrRdHs   Potassium 3.9 3.4 - 5.3 mmol/L  FrRdHs   Chloride 110 94 - 109 mmol/L H FrRdHs   Carbon Dioxide 27 20 - 32 mmol/L  FrRdHs   Anion Gap 6 3 - 14 mmol/L  FrRdHs   Glucose 74 70 - 99 mg/dL  FrRdHs   Urea Nitrogen 18 7 - 30 mg/dL  FrRdHs   Creatinine 1.12 0.66 - 1.25 mg/dL  FrRdHs   GFR Estimate 76 >60 mL/min/1.7m2  FrRd   Comment:  Non  GFR Calc   GFR Estimate If Black >90 >60 mL/min/1.7m2  FrRd   Comment:  African American GFR Calc   Calcium 8.2 8.5 - 10.1 mg/dL L FrRdHs            CBC with platelets [[id number]] (Abnormal)  Resulted: 10/09/18 05, Result status: Final result    Ordering provider: Aby Blair PA-C  10/09/18 0001 Resulting lab: [facility]    Specimen Information    Type Source Collected On   Blood  10/09/18 0521          Components       Value Reference Range Flag Lab   WBC 6.1 4.0 - 11.0 " 10e9/L  [facility]   RBC Count 4.37 4.4 - 5.9 10e12/L L FrRd   Hemoglobin 13.2 13.3 - 17.7 g/dL L Rd   Hematocrit 39.2 40.0 - 53.0 % L Rd   MCV 90 78 - 100 fl  FrRd   MCH 30.2 26.5 - 33.0 pg  FrRd   MCHC 33.7 31.5 - 36.5 g/dL  Rd   RDW 12.6 10.0 - 15.0 %  FrRd   Platelet Count 170 150 - 450 10e9/L  [facility]            Drug screen urine [[id number]] (Abnormal)  Resulted: 10/08/18 2223, Result status: In process    Ordering provider: Aby Blair PA-C  10/08/18 1946 Resulting lab: [facility]    Specimen Information    Type Source Collected On   Urine Urine clean catch 10/08/18 2150          Components       Value Reference Range Flag Lab   Benzodiazepine Qual Urine Negative NEG^Negative  [facility]   Comment:  Cutoff for a negative benzodiazepine is 200 ng/mL or less.   Cannabinoids Qual Urine Positive NEG^Negative A [facility]   Comment:         Cutoff for a positive cannabinoid is greater than 50 ng/mL. This is an   unconfirmed screening result to be used for medical purposes only.     Cocaine Qual Urine Negative NEG^Negative  [facility]   Comment:  Cutoff for a negative cocaine is 300 ng/mL or less.   Opiates Qualitative Urine Negative NEG^Negative  [facility]   Comment:  Cutoff for a negative opiate is 300 ng/mL or less.   Acetaminophen Qual PENDING NEG^Negative     Amantadine Qual PENDING NEG^Negative     Amitriptyline Qual PENDING NEG^Negative     Amoxapine Qual PENDING NEG^Negative     Amphetamines Qual PENDING NEG^Negative     Atropine Qual PENDING NEG^Negative     Caffeine Qual PENDING NEG^Negative     Carbamazepine Qual PENDING NEG^Negative     Chlorpheniramine Qual PENDING NEG^Negative     Chlorpromazine Qual PENDING NEG^Negative     Citalopram Qual PENDING NEG^Negative     Clomipramine Qual PENDING NEG^Negative     Cocaine Qual PENDING NEG^Negative     Codeine Qual PENDING NEG^Negative     Desipramine Qual PENDING NEG^Negative     Dextromethorphan Qual PENDING NEG^Negative      Diphenhydramine Qual PENDING NEG^Negative     Doxepin/metabolite Qual PENDING NEG^Negative     Doxylamine Qual PENDING NEG^Negative     Ephedrine or pseudo Qual PENDING NEG^Negative     Fentanyl Qual PENDING NEG^Negative     Fluoxetine and metab Qual PENDING NEG^Negative     Hydrocodone Qual PENDING NEG^Negative     Hydromorphone Qual PENDING NEG^Negative     Ibuprofen Qual PENDING NEG^Negative     Imipramine Qual PENDING NEG^Negative     Lamotrigine Qual PENDING NEG^Negative     Loxapine Qual PENDING NEG^Negative     Maprotiline Qual PENDING NEG^Negative     MDMA Qual PENDING NEG^Negative     Meperidine Qual PENDING NEG^Negative     Methadone Qual PENDING NEG^Negative     Methamphetamine Qual PENDING NEG^Negative     Morphine Qual PENDING NEG^Negative     Nicotine Qual PENDING NEG^Negative     Nortriptyline Qual PENDING NEG^Negative     Olanzapine Qual PENDING NEG^Negative     Oxycodone Qual PENDING NEG^Negative     Pentazocine Qual PENDING NEG^Negative     Phencyclidine Qual PENDING NEG^Negative     Phenmetrazine Qual PENDING NEG^Negative     Phentermine Qual PENDING NEG^Negative     Phenylbutazone Qual PENDING NEG^Negative     Phenylpropanolamine Qual PENDING NEG^Negative     Propoxyphene Qual PENDING NEG^Negative     Propranolol Qual PENDING NEG^Negative     Pyrilamine Qual PENDING NEG^Negative     Salicylate Qual PENDING NEG^Negative     Theobromine Qual PENDING NEG^Negative     Trimipramine Qual PENDING NEG^Negative     Topiramate Qual PENDING NEG^Negative     Venlafaxine Qual PENDING NEG^Negative              UA with Microscopic reflex to Culture [[id number]] (Abnormal)  Resulted: 10/08/18 2210, Result status: Final result    Ordering provider: Aby Blair PA-C  10/08/18 1946 Resulting lab: [facility]    Specimen Information    Type Source Collected On   Unspecified Urine Urine clean catch 10/08/18 2150          Components       Value Reference Range Flag Lab   Color Urine Yellow    FrRdHs   Appearance Urine Clear   FrRdHs   Glucose Urine Negative NEG^Negative mg/dL  FrRdHs   Bilirubin Urine Negative NEG^Negative  FrRdHs   Ketones Urine Negative NEG^Negative mg/dL  FrRdHs   Specific Gravity Urine 1.027 1.003 - 1.035  FrRdHs   Blood Urine Negative NEG^Negative  FrRdHs   pH Urine 7.0 5.0 - 7.0 pH  FrRdHs   Protein Albumin Urine Negative NEG^Negative mg/dL  FrRdHs   Urobilinogen mg/dL 4.0 0.0 - 2.0 mg/dL H FrRdHs   Nitrite Urine Negative NEG^Negative  FrRdHs   Leukocyte Esterase Urine Negative NEG^Negative  FrRdHs   Source Unspecified Urine   FrRdHs   WBC Urine 1 0 - 5 /HPF  FrRdHs   RBC Urine 1 0 - 2 /HPF  FrRdHs   Squamous Epithelial /HPF Urine <1 0 - 1 /HPF  FrRdHs   Mucous Urine Present NEG^Negative /LPF A FrRdHs            TSH with free T4 reflex [[id number]]  Resulted: 10/08/18 1720, Result status: Final result    Ordering provider: Krzysztof Akbar MD  10/08/18 1449 Resulting lab: [facility]    Specimen Information    Type Source Collected On     10/08/18 1449          Components       Value Reference Range Flag Lab   TSH 1.42 0.40 - 4.00 mU/L  Rd            D dimer quantitative [[id number]]  Resulted: 10/08/18 1717, Result status: Final result    Ordering provider: Krzysztof Akbar MD  10/08/18 1449 Resulting lab: [facility]    Specimen Information    Type Source Collected On     10/08/18 1449          Components       Value Reference Range Flag Lab   D Dimer <0.3 0.0 - 0.50 ug/ml FEU  Rd   Comment:         This D-dimer assay is intended for use in conjunction with a clinical pretest   probability assessment model to exclude pulmonary embolism (PE) and deep   venous thrombosis (DVT) in outpatients suspected of PE or DVT. The cut-off   value is 0.5 ug/mL FEU.              Testing Performed By     Lab - Abbreviation Name Director Address Valid Date Range    12 - FrRdHs [facility] Unknown [address] [facility] [address]  05/08/15 1057 - Present            Unresulted Labs     None         Imaging Results - 3 Days      Head CT w/o contrast [[id number]]  Resulted: 10/08/18 2303, Result status: Final result    Ordering provider: Krzysztof Akbar MD  10/08/18 1706 Resulted by: Juan Pablo Madera MD    Performed: 10/08/18 1733 - 10/08/18 1742 Resulting lab: RADIOLOGY RESULTS    Narrative:       CT OF THE HEAD WITHOUT CONTRAST 10/8/2018 5:42 PM     COMPARISON: Head CT 4/6/2013.    HISTORY:  Lightheadedness.    TECHNIQUE: Axial CT images of the head from the skull base to the  vertex were acquired without IV contrast.    FINDINGS: The ventricles and basal cisterns are within normal limits  in configuration. There is no midline shift. There are no extra-axial  fluid collections.  Gray-white differentiation is well maintained.    No intracranial hemorrhage, mass or recent infarct.    The visualized paranasal sinuses are well-aerated. There is no  mastoiditis. There are no fractures of the visualized bones.      Impression:       IMPRESSION:  Normal head CT.    Radiation dose for this scan was reduced using automated exposure  control, adjustment of the mA and/or kV according to patient size, or  iterative reconstruction technique.       JUAN PABLO MADERA MD      POC US ECHO LIMITED [[id number]] (Normal)  Resulted: 10/08/18 1543, Result status: Final result    Ordering provider: Krzysztof Akbar MD  10/08/18 1543 Performed: 10/08/18 1543 - 10/08/18 1543    Resulting lab: RADIANT POCUS      Impression:       PROCEDURE NOTE: ED BEDSIDE LIMITED ULTRASOUND  Name of the study: Limited Echo  Performed by: Dr. Akbar  Indications: syncope   Body Location / Organs Imaged: Multiple planes of the heart, bilateral lungs.  Findings: Good global function, normal RV dilation, IVC within normal limits,   No pericardial effusion  Interpretation: Good global function without evidence of effusion.  Archiving of images: Images were also saved digitally to  the internal hard drive of the ultrasound machine.          XR Chest 2 Views [[id number]]  Resulted: 10/08/18 1525, Result status: Final result    Ordering provider: Krzysztof Akbar MD  10/08/18 1427 Resulted by: Claudia Aden MD    Performed: 10/08/18 1512 - 10/08/18 1519 Resulting lab: RADIOLOGY RESULTS    Narrative:       XR CHEST 2 VW 10/8/2018 3:19 PM    COMPARISON: None.    HISTORY: Syncope.      Impression:       IMPRESSION: Cardiac silhouette and pulmonary vasculature are within  normal limits. No focal airspace disease, pleural effusion or  pneumothorax.    CLAUDIA ADEN MD      Testing Performed By     Lab - Abbreviation Name Director Address Valid Date Range    104 - Rad Rslts RADIOLOGY RESULTS Unknown Unknown 05 1553 - Present    1735 - RADIANT POCUS RADIANT POCUS Unknown Unknown 05/12/15 0832 - Present               ECG/EMG Results      Echocardiogram Complete [[id number]]  Resulted: 10/09/18 0749, Result status: Edited Result - FINAL    Ordering provider: Aby Gonzalez PA-C  10/08/18 1946 Resulted by: Last Casas MD    Performed: 10/09/18 0811 - 10/09/18 0811 Resulting lab: RADIOLOGY RESULTS    Narrative:       [id number]  [facility]  [id number]  [id number]^LISA^ABY^S           [facility]  Echocardiography Laboratory  [address] [place] [address]        Name: ROSMERY BAKER  MRN: [id number]  : 1985  Study Date: 10/09/2018 07:49 AM  Age: 33 yrs  Gender: Male  Patient Location: [facility]  Reason For Study: Syncope  Ordering Physician: ABY GONZALEZ  Performed By: Carlos Guevara RDCS     BSA: 2.0 m2  Height: 74 in  Weight: 160 lb  HR: 47  BP: 124/63 mmHg  _____________________________________________________________________________  __        Procedure  Complete Portable Echo Adult.  _____________________________________________________________________________  __        Interpretation Summary     The visual ejection fraction is estimated at  55-60%.  The right ventricle is normal in size and function.  _____________________________________________________________________________  __        Left Ventricle  The left ventricle is normal in size. There is normal left ventricular wall  thickness. The visual ejection fraction is estimated at 55-60%. Left  ventricular diastolic function is normal. No regional wall motion  abnormalities noted.     Right Ventricle  The right ventricle is normal in size and function.     Atria  Normal left atrial size. Right atrial size is normal. There is no atrial shunt  seen.     Mitral Valve  The mitral valve is normal in structure and function. There is trace mitral  regurgitation.        Tricuspid Valve  The tricuspid valve is normal in structure and function. There is trace  tricuspid regurgitation. Right ventricular systolic pressure could not be  approximated due to inadequate tricuspid regurgitation.     Aortic Valve  Normal tricuspid aortic valve. No aortic regurgitation is present. No  hemodynamically significant valvular aortic stenosis.     Pulmonic Valve  The pulmonic valve is not well seen, but is grossly normal. There is no  pulmonic valvular regurgitation.     Vessels  The aortic root is normal size. Normal size ascending aorta. Dilated inferior  vena cava.     Pericardium  There is no pericardial effusion.        Rhythm  The rhythm was sinus bradycardia.  _____________________________________________________________________________  __  MMode/2D Measurements & Calculations     IVSd: 0.78 cm  LVIDd: 4.5 cm  LVIDs: 3.0 cm  LVPWd: 0.99 cm  FS: 33.2 %  LV mass(C)d: 132.5 grams  LV mass(C)dI: 67.0 grams/m2  Ao root diam: 3.0 cm  LA dimension: 3.3 cm  asc Aorta Diam: 2.8 cm  LA/Ao: 1.1  LA Volume (BP): 34.0 ml  LA Volume Index (BP): 17.2 ml/m2  RWT: 0.44           Doppler Measurements & Calculations  MV E max oral: 105.8 cm/sec  MV A max oral: 50.5 cm/sec  MV E/A: 2.1  MV dec time: 0.19 sec  E/E' av.8  Lateral  E/e': 5.7  Medial E/e': 7.9           _____________________________________________________________________________  __           Report approved by: Last Chi 10/09/2018 09:57 AM       1    Type Source Collected On     10/09/18 0749          View Image (below)              Encounter-Level Documents:     There are no encounter-level documents.      Order-Level Documents:     There are no order-level documents.

## 2018-10-08 NOTE — H&P
[facility]  Observation Unit  H & P      Patient Name: Abebe Elias MRN# [id number]   Age: 33 year old YOB: 1985     Date of Admission:10/8/2018    Primary care provider: Carolyn Ref-Primary, Physician  Date of Service: 10/8/2018         Assessment and Plan:   Abebe Elias is a 33 year old male with a history of Prostatitis, Tobacco Abuse, Marijuana abuse who presents to the ED today from Urgent Care 2/2 dizziness with syncope.      Syncope - life long symptoms of intermittent lightheadedness with changing positions increased in the past 4 days with 4 syncopal events.    Work up thus far revealed sinus bradycardia.  No signs of infection, electrolyte abnormality, anemia, hypoxia, hypoglycemia, seizure, focal neurologic deficits.  CT head w/out contrast negative.   Admitted to observation for further evaluation.  Will evaluate for cardiology etiology further with telemetry and echocardiogram.  Also consider Orthostatic Hypotension, Chronic Orthostatic Intolerance, Autonomic Dysfunction, POTS, Psychogenic vs other.    - telemetry monitoring  - echocardiogram  - check orthostatic vital signs  - IVF hydration  - check UA  - serial neuro exams  - consider Neuro consult in am    Substance Abuse - daily e-cigarettes and marijuana use.    - check urine tox      CODE: Full  Diet/IVF: regular, NS  DVT ppx: scd      Aby GRIMESC  Physician Assistant   Hospitalist Service  Pager: [phone]           Chief Complaint:   Lightheadedness         HPI:   33 year old male with a history of Prostatitis, Tobacco Abuse, Marijuana abuse who presents to the ED today from Urgent Care 2/2 dizziness with syncope.    Patient reports he has a life long history of dizziness/lightheadedness when changing from sitting to standing positions.  He remembers this as far back as age 7.  Symptoms will occur about once every 3-4 months and he will just stand for 1-2 minutes and his lightheadedness will  "resolve.  Over the past 4 days, he has had increased episodes.  He reports feeling \"woozy\", \"like I am going to pass out\" every time he changes from a lying or sitting position to standing.  He reports passing out 4 times in the past 4 days, every episode was not witnessed.  He denied any injuries.  Symptoms improve with lying down.  He denies the sensation the room is spinning.  Today, he has felt his lips and hands tingling which prompted him to present to the Urgent Care where he work up was unremarkable, so he was sent to the ED.  Patient denies headaches, changes in vision, vertiginous symptoms, chest pain, palpitations, upper respiratory symptoms of rhinorrhea or congestion, shortness of breath, cough, sputum production, wheezing, abdominal pain, nausea, emesis, constipation, diarrhea, dysuria, edema, rashes, recent travel, recent illness, fever, chills.  He had two loose stools yesterday, but attributed that to something he ate and it has since resolved.  He does not currently take any medications.  He denies any history of heart disease.  Family denies any twitching or seizure like activity.  He denies any increased stress recently.  He smokes marijuana daily and e-cigarettes.  Denies etoh use.        ED work up revealed patient hemodynamically stable, HR 50-65, afebrile on room air.  Orthostatic blood pressures were negative.  Laboratory work up in the urgent care earlier today revealed BMP, CBC wnl.  EKG at Urgent Care with sinus bradycardia.  EKG here with sinus bradycardia with a rate of 49.  CXR negative.  Limited bedside echocardiogram done by ER staff was reported as grossly wnl.  Labs here with TSH wnl and negative ddimer.  CT head w/out contrast was negative.  Patient received IVF and admitted for further management.         Past Medical History:     Past Medical History:   Diagnosis Date     Acute tonsillitis     Recurrent, hospitalized 2/05     Epididymitis, bilateral      Epididymitis, left      " "Epididymitis, right      History of prostatitis 3/23/2017     Prostate infection           Past Surgical History:     Past Surgical History:   Procedure Laterality Date     C NONSPECIFIC PROCEDURE  1988    hypospadias repair     HAND SURGERY  2009    tendon rpture     PENIS SURGERY       TONSILLECTOMY & ADENOIDECTOMY  2006          Social History:     Social History     Social History     Marital status:      Spouse name: N/A     Number of children: N/A     Years of education: N/A     Occupational History      [email or web address]     [employer]     Social History Main Topics     Smoking status: Former Smoker     Packs/day: 0.50     Years: 2.00     Types: Cigarettes     Quit date: 12/4/2016     Smokeless tobacco: Former User      Comment: 3/4 of pack daily     Alcohol use No      Comment: Once a month     Drug use: No     Sexual activity: Yes     Partners: Female     Other Topics Concern     Parent/Sibling W/ Cabg, Mi Or Angioplasty Before 65f 55m? No     Social History Narrative          Family History:     Family History   Problem Relation Age of Onset     Diabetes Mother      Diabetes Maternal Grandmother      Diabetes Maternal Grandfather      Cancer Maternal Grandfather      lung     C.A.D. No family hx of      Hypertension No family hx of           Allergies:      Allergies   Allergen Reactions     Hydrocodone Itching     Also intense itching.     Penicillins      Pseudoephedrine      Vicodin [Hydrocodone-Acetaminophen]           Medications:     Prior to Admission medications    Medication Sig Last Dose Taking? Auth Provider   Ibuprofen (ADVIL PO)    Reported, Patient          Review of Systems:   A complete ROS was performed and is negative other than what is stated in the HPI.       Physical Exam:   Blood pressure 120/87, pulse 65, temperature 98.2  F (36.8  C), temperature source Temporal, resp. rate 18, height 1.88 m (6' 2\"), weight 72.6 kg (160 lb), SpO2 99 %.  General: Alert, interactive, NAD, " lying in bed eating fast food, family at the bedside.  HEENT: AT/NC, sclera anicteric, PERRL, EOMI  Chest/Resp: clear to auscultation bilaterally, no crackles or wheezes  Heart/CV: regular rate and rhythm, no murmur  Abdomen/GI: Soft, nontender, nondistended. +BS.  No rebound or guarding.  Extremities/MSK: No LE edema  Skin: Warm and dry  Neuro: Alert & oriented x 3, no focal deficits, moves all extremities equally         Labs:   CMPNo lab results found in last 7 days.  CBCNo lab results found in last 7 days.  INRNo lab results found in last 7 days.  Arterial Blood GasNo lab results found in last 7 days.       Imaging/Procedures:     Results for orders placed or performed during the hospital encounter of 10/08/18   XR Chest 2 Views    Narrative    XR CHEST 2 VW 10/8/2018 3:19 PM    COMPARISON: None.    HISTORY: Syncope.      Impression    IMPRESSION: Cardiac silhouette and pulmonary vasculature are within  normal limits. No focal airspace disease, pleural effusion or  pneumothorax.    CLAUDIA ADEN MD

## 2018-10-08 NOTE — PHARMACY-ADMISSION MEDICATION HISTORY
Admission medication history interview status for this patient is complete. See [facility] admission navigator for allergy information, prior to admission medications and immunization status.     Medication history interview source(s):Patient    Prior to Admission medications    None

## 2018-10-08 NOTE — ED TRIAGE NOTES
33-year-old male presents to the ER with complaints of faint and light headed. Feels syncope. States he gets up and that is when he feels like he is going to pass out. Pt has had four episodes in the last day and a half. Pt was seen at [facility] and EKG negative and lab work was negative.

## 2018-10-08 NOTE — IP AVS SNAPSHOT
MRN:[id number]                      After Visit Summary   10/8/2018    Abebe Elias    MRN: [id number]           Thank you!     Thank you for choosing [facility] for your care. Our goal is always to provide you with excellent care. Hearing back from our patients is one way we can continue to improve our services. Please take a few minutes to complete the written survey that you may receive in the mail after you visit. If you would like to speak to someone directly about your visit please contact Patient Relations at [phone]. Thank you!          Patient Information     Date Of Birth          1985        About your hospital stay     You were admitted on:  October 8, 2018 You last received care in the:  [facility] Observation Department    You were discharged on:  October 9, 2018        Reason for your hospital stay       You were admitted for concerns of syncope. No cause was found in the emergency room for the syncope but we suspect it may be due to mild orthostatic hypotension and/or autonomic dysfunction. We monitored your heart overnight with no abnormal findings. You had an echocardiogram done that showed normal heart function and no valvular abnormalities. Given your negative work up we are comfortable sending you home with follow up with your primary care provider for tilt table testing and additional workup as needed for your ongoing lightheadedness and syncopal episodes.                  Who to Call     For medical emergencies, please call 911.  For non-urgent questions about your medical care, please call your primary care provider or clinic, None          Attending Provider     Provider Specialty    Krzysztof Akbar MD Emergency Medicine    Young, Ector Coffman MD Internal Medicine       Primary Care Provider Fax #    Physician No Ref-Primary [phone]      After Care Instructions     Activity       Your activity upon discharge: activity as  "tolerated            Diet       Follow this diet upon discharge: Orders Placed This Encounter      Regular Diet Adult            Discharge Instructions       1. Continue to stay hydrated   2. Contact [facility] to set up care with a new primary care provider in order to obtain order for a tilt table test   3. Slowly change positions in order to attempt to prevent recurrence of lightheadedness                  Follow-up Appointments     Follow-up and recommended labs and tests        Establish care and follow up with primary care provider within 7 days hospital follow up and ongoing workup for lightheadedness.  The following labs/tests are recommended: tile table testing for autonomic dysfunction.                             Pending Results     Date and Time Order Name Status Description    10/8/2018 1946 Drug screen urine In process             Statement of Approval     Ordered          10/09/18 1142  I have reviewed and agree with all the recommendations and orders detailed in this document.  EFFECTIVE NOW     Approved and electronically signed by:  Charlotte Lynn PA-C             Admission Information     Date & Time Provider Department Dept. Phone    10/8/2018 Ector Ribeiro MD [facility] Observation Department [phone]      Your Vitals Were     Blood Pressure Pulse Temperature Respirations Height Weight    123/63 52 96.9  F (36.1  C) (Oral) 18 1.88 m (6' 2\") 72.6 kg (160 lb)    Pulse Oximetry BMI (Body Mass Index)                100% 20.54 kg/m2          MyChart Information     [facility] gives you secure access to your electronic health record. If you see a primary care provider, you can also send messages to your care team and make appointments. If you have questions, please call your primary care clinic.  If you do not have a primary care provider, please call [phone] and they will assist you.        Care EveryWhere ID     This is your Care EveryWhere ID. This could be used " by other organizations to access your [facility] medical records  [id number]        Equal Access to Services     ENRIQUETA OSPINA : Shandra Zuñiga, elvia elise, karla walker, kandy glover. So [facility] [phone].    ATENCIÓN: Si habla español, tiene a hernandez disposición servicios gratuitos de asistencia lingüística. Llame al [phone].    We comply with applicable federal civil rights laws and [place] laws. We do not discriminate on the basis of race, color, national origin, age, disability, sex, sexual orientation, or gender identity.               Review of your medicines      Notice     You have not been prescribed any medications.             Protect others around you: Learn how to safely use, store and throw away your medicines at www.disposemymeds.org.             Medication List: This is a list of all your medications and when to take them. Check marks below indicate your daily home schedule. Keep this list as a reference.      Notice     You have not been prescribed any medications.

## 2018-10-08 NOTE — IP AVS SNAPSHOT
[facility] Observation Department    [address]    [facility] [address]    Phone:  [phone]                                       After Visit Summary   10/8/2018    Abebe Elias    MRN: [id number]           After Visit Summary Signature Page     I have received my discharge instructions, and my questions have been answered. I have discussed any challenges I see with this plan with the nurse or doctor.    ..........................................................................................................................................  Patient/Patient Representative Signature      ..........................................................................................................................................  Patient Representative Print Name and Relationship to Patient    ..................................................               ................................................  Date                                   Time    ..........................................................................................................................................  Reviewed by Signature/Title    ...................................................              ..............................................  Date                                               Time          22EPIC Rev 08/18

## 2018-10-08 NOTE — ED PROVIDER NOTES
"  History     Chief Complaint:  Loss of Consciousness    HPI   Abebe Elias is a 33 year old male who presents for evaluation after a loss of consciousness. Over the past few years, he has had a couple of fainting episodes. In the past 4 days, he has had increased frequency of fainting with 4 episodes. They occur when he stands up too quickly, gets lightheaded, and has to hold onto walls in order to steady himself. He woke up this morning with a tingling feeling in his lips, arms, and fingers which is still present. He presents today after his symptoms began while sitting down. He felt the LH come on, and this was very abnormal for him. He stood up and felt tingly all over, and felt like he had a pressure drop. He gets more LH, feels faint, and has \"almost\" tunnel vision.     The feeling that he is going to pass out has not gone away today as it usually does after he faints. He does not think that he has hit his head in the past few days. Denies chest pain, chest pressure, back pain, or shortness of breath associated with his fainting episodes.  No recent travel, or leg swelling. Denies feeling warm or cold all the time when others in the room are not feeling that way. He has never seen a doctor for any of these fainting symptoms. States he tries to stay more active and is trying to get back to the gym more. No alcohol use, or hard drugs. He smokes pot about once a day, and has not increased or decreased his use.     Of note, two weeks ago, he had a procedure teeth ultrasonically cleaned, and placed on antibiotics. Clindamycin and swish and spit mouthwash. Two days after the dentist, he had a pressure in his chest which was present for less than a minute, and went away.    Allergies:  Hydrocodone  Penicillins  Pseudoephedrine  Vicodin [Hydrocodone-Acetaminophen]      Medications:    Ibuprofen  Percocet     Past Medical History:    Acute tonsillitis  Epididymitis, bilateral  Prostatitis    Past Surgical History: " "   Hypospadias repair  Tendon rupture repair  Penis surgery  Tonsillectomy and Adenoidectomy    Family History:    Diabetes    Social History:  Relationship status:   Tobacco use: Former, 0.50 PPD for 2 years. Quit date 2016.   Alcohol use: No  The patient presents with his wife.    Marital Status:   [2]     Review of Systems   Constitutional: Negative for activity change and fever.   Respiratory: Negative for chest tightness and shortness of breath.    Cardiovascular: Negative for chest pain and leg swelling.   Neurological: Positive for syncope and light-headedness. Negative for dizziness, weakness, numbness and headaches.   All other systems reviewed and are negative.    Physical Exam     Patient Vitals for the past 24 hrs:   BP Temp Temp src Pulse Heart Rate Resp SpO2 Height Weight   10/08/18 1500 124/63 - - - 50 - - - -   10/08/18 1258 120/87 98.2  F (36.8  C) Temporal 65 - 18 99 % 1.88 m (6' 2\") 72.6 kg (160 lb)   Lying Orthostatic BP: 126/79  Lying Orthostatic Pulse: 55 bpm  Sitting Orthostatic BP: 130/75  Sitting Orthostatic Pulse: 60 bpm  Standing Orthostatic BP: 117/81  Standing Orthostatic Pulse: 55 bpm    Physical Exam  Constitutional: Alert, attentive, GCS 15  HENT:    Nose: Nose normal.    Mouth/Throat: Oropharynx is clear, mucous membranes are moist   Eyes: EOM are normal, anicteric, conjugate gaze  CV: Bradycardic rate and normal rhythm; no murmurs  Chest: Effort normal and breath sounds clear without wheezing or rales, symmetric bilaterally   GI:  non tender. No distension. No guarding or rebound.    MSK: No LE edema, tenderness to palpation of BLE.  Neurological: Alert, attentive, moving all extremities equally.   Skin: Skin is warm and dry.     Emergency Department Course     ECG:  ECG #1 taken at 1304, ECG read at 1305 by Dr. Pepper.  Sinus bradycardia  Otherwise normal ECG  Rate 48 bpm. FL interval 140. QRS duration 100. QT/QTc 428/382. P-R-T axes 63  89  68.     ECG #2 taken at " 1502, ECG read at 1502 by Dr. Akbar.  Sinus bradycardia  No significant change compared to EKG dated 10/8/18.  Otherwise normal ECG  Rate 49 bpm. MT interval 144. QRS duration 102. QT/QTc 432/390. P-R-T axes 66  88  64.     Imaging:  Radiology findings were communicated with the patient and family who voiced understanding of the findings.    XR Chest 2 Views  IMPRESSION: Cardiac silhouette and pulmonary vasculature are within  normal limits. No focal airspace disease, pleural effusion or  Pneumothorax.  Reading per radiology.    Head CT w/o Contrast  IMPRESSION:  Normal head CT.  Reading per radiology.     Laboratory:  Laboratory findings were communicated with the patient who voiced understanding of the findings.  D Dimer (Collected 1449): <0.3  TSH with free T4 reflex: 1.42    Procedures:  Limited Bedside Cardiac Ultrasound, performed and interpreted by me.   PROCEDURE NOTE: ED BEDSIDE LIMITED ULTRASOUND  Name of the study: Limited Echo  Performed by: Dr. Akbar  Indications: syncope   Body Location / Organs Imaged: Multiple planes of the heart, bilateral lungs.  Findings: Good global function, normal RV dilation, IVC within normal limits,   No pericardial effusion  Interpretation: Good global function without evidence of effusion.  Archiving of images: Images were also saved digitally to the internal hard drive of the ultrasound machine.                Interventions:  1726 Normal Saline 1000 mL IV      Emergency Department Course:  Nursing notes and vitals reviewed.  I performed an exam of the patient as documented above.   EKG obtained in the ED, see results above.    The patient was sent for a XR, CT while in the emergency department, results above.      1610: I performed a cardiac ultrasound.   1704: I spoke with Johnnie for Dr. Ribeiro.     I discussed the treatment plan with the patient. They expressed understanding of this plan and consented to admission. I discussed the patient with Johnnie for Dr. Ribeiro,  who will admit the patient to a monitored bed for further evaluation and treatment.     I personally reviewed the laboratory results with the Patient and answered all related questions prior to admission.    Impression & Plan      Medical Decision Makin-year-old male with no significant past medical history presenting for evaluation of recurrent syncope over the last 4 days.  Vital signs on arrival within normal limits, however on EKG and serial vital signs he was noted to have heart rates down into the 40s.  By history and physical exam, concern is for possible symptomatic bradycardia given patient reporting progressive, worsening lightheadedness previously only associated with positional changes but now occurring intermittently at rest and found to have bradycardia to the 40s here.  He does not have any history of exertional intolerance and is able to run 2 miles and under 20 minutes without shortness of breath or chest pain, no family history of sudden cardiac death or early MI.  His EKG shows normal intervals without evidence of ischemia and demonstrates sinus bradycardia.  Systolic blood pressure checks negative, with heart rate and blood pressure remaining within normal limits.  Bedside echo shows no pericardial effusion, normal LV function.  He did have electrolytes and hemoglobin checked in urgent care prior to arrival and they were within normal limits.  TSH d-dimer negative.  CT scan of the head was obtained along with chest x-ray which were both negative.  Do not suspect patient has vertiginous symptoms as he very clearly describes lightheadedness with now 4 episodes of syncope and no evidence of trauma from these falls.  He is able to feel them coming on but does not have clear prodrome including nausea lightheadedness or tunnel vision but is able to lower himself to the ground.  Given patient is endorsing persistent symptoms while in the emergency department despite a negative workup other than  bradycardia on EKG, will admit for further observation.    Diagnosis:    ICD-10-CM    1. Bradycardia R00.1 D dimer quantitative     D dimer quantitative     TSH with free T4 reflex     TSH with free T4 reflex     CANCELED: TSH with free T4 reflex     CANCELED: D dimer quantitative      Disposition:   Admitted to Observation    CMS Diagnoses: None     Scribe Disclosure:  I, Maria Teresa Sepulveda, am serving as a scribe at 3:24 PM on 10/8/2018 to document services personally performed by Krzysztof Akbar MD, based on my observations and the provider's statements to me.     Maria Teresa Sepulveda  10/8/2018   [facility] EMERGENCY DEPARTMENT       Krzysztof Akbar MD  10/08/18 8367

## 2018-10-09 ENCOUNTER — APPOINTMENT (OUTPATIENT)
Dept: CARDIOLOGY | Facility: CLINIC | Age: 33
End: 2018-10-09
Attending: PHYSICIAN ASSISTANT
Payer: COMMERCIAL

## 2018-10-09 VITALS
HEART RATE: 52 BPM | OXYGEN SATURATION: 100 % | SYSTOLIC BLOOD PRESSURE: 123 MMHG | HEIGHT: 74 IN | BODY MASS INDEX: 20.53 KG/M2 | DIASTOLIC BLOOD PRESSURE: 63 MMHG | TEMPERATURE: 96.9 F | RESPIRATION RATE: 18 BRPM | WEIGHT: 160 LBS

## 2018-10-09 LAB
ACETAMINOPHEN QUAL: NEGATIVE
AMANTADINE: NEGATIVE
AMITRIPTYLINE QUAL: NEGATIVE
AMOXAPINE: NEGATIVE
AMPHETAMINES QUAL: NEGATIVE
ANION GAP SERPL CALCULATED.3IONS-SCNC: 6 MMOL/L (ref 3–14)
ATROPINE: NEGATIVE
BENZODIAZ UR QL: NEGATIVE
BUN SERPL-MCNC: 18 MG/DL (ref 7–30)
CAFFEINE QUAL: POSITIVE
CALCIUM SERPL-MCNC: 8.2 MG/DL (ref 8.5–10.1)
CANNABINOIDS UR QL SCN: POSITIVE
CARBAMAZEPINE QUAL: NEGATIVE
CHLORIDE SERPL-SCNC: 110 MMOL/L (ref 94–109)
CHLORPHENIRAMINE: NEGATIVE
CHLORPROMAZINE: NEGATIVE
CITALOPRAM QUAL: NEGATIVE
CLOMIPRAMINE QUAL: NEGATIVE
CO2 SERPL-SCNC: 27 MMOL/L (ref 20–32)
COCAINE QUAL: NEGATIVE
COCAINE UR QL: NEGATIVE
CODEINE QUAL: NEGATIVE
CREAT SERPL-MCNC: 1.12 MG/DL (ref 0.66–1.25)
DESIPRAMINE QUAL: NEGATIVE
DEXTROMETHORPHAN: NEGATIVE
DIPHENHYDRAMINE: POSITIVE
DOXEPIN/METABOLITE: NEGATIVE
DOXYLAMINE: NEGATIVE
EPHEDRINE OR PSEUDO: NEGATIVE
ERYTHROCYTE [DISTWIDTH] IN BLOOD BY AUTOMATED COUNT: 12.6 % (ref 10–15)
FENTANYL QUAL: NEGATIVE
FLUOXETINE AND METAB: NEGATIVE
GFR SERPL CREATININE-BSD FRML MDRD: 76 ML/MIN/1.7M2
GLUCOSE SERPL-MCNC: 74 MG/DL (ref 70–99)
HCT VFR BLD AUTO: 39.2 % (ref 40–53)
HGB BLD-MCNC: 13.2 G/DL (ref 13.3–17.7)
HYDROCODONE QUAL: NEGATIVE
HYDROMORPHONE QUAL: NEGATIVE
IBUPROFEN QUAL: NEGATIVE
IMIPRAMINE QUAL: NEGATIVE
INTERPRETATION ECG - MUSE: NORMAL
INTERPRETATION ECG - MUSE: NORMAL
LAMOTRIGINE QUAL: NEGATIVE
LOXAPINE: NEGATIVE
MAPROTYLINE: NEGATIVE
MCH RBC QN AUTO: 30.2 PG (ref 26.5–33)
MCHC RBC AUTO-ENTMCNC: 33.7 G/DL (ref 31.5–36.5)
MCV RBC AUTO: 90 FL (ref 78–100)
MDMA QUAL: NEGATIVE
MEPERIDINE QUAL: NEGATIVE
METHAMPHETAMINE: NEGATIVE
METHODONE QUAL: NEGATIVE
MORPHINE QUAL: NEGATIVE
NICOTINE: NEGATIVE
NORTRIPTYLINE QUAL: NEGATIVE
OLANZAPINE QUAL: NEGATIVE
OPIATES UR QL SCN: NEGATIVE
OXYCODONE QUAL: NEGATIVE
PENTAZOCINE: NEGATIVE
PHENCYCLIDINE QUAL: NEGATIVE
PHENMETRAZINE: NEGATIVE
PHENTERMINE: NEGATIVE
PHENYLBUTAZONE: NEGATIVE
PHENYLPROPANOLAMINE: NEGATIVE
PLATELET # BLD AUTO: 170 10E9/L (ref 150–450)
POTASSIUM SERPL-SCNC: 3.9 MMOL/L (ref 3.4–5.3)
PROPOXPHENE QUAL: NEGATIVE
PROPRANOLOL QUAL: NEGATIVE
PYRILAMINE: NEGATIVE
RBC # BLD AUTO: 4.37 10E12/L (ref 4.4–5.9)
SALICYLATE QUAL: NEGATIVE
SODIUM SERPL-SCNC: 143 MMOL/L (ref 133–144)
THEOBROMINE: POSITIVE
TOPIRAMATE QUAL: NEGATIVE
TRIMIPRAMINE QUAL: NEGATIVE
VENLAFAXINE QUAL: NEGATIVE
WBC # BLD AUTO: 6.1 10E9/L (ref 4–11)

## 2018-10-09 PROCEDURE — 80048 BASIC METABOLIC PNL TOTAL CA: CPT | Performed by: PHYSICIAN ASSISTANT

## 2018-10-09 PROCEDURE — 99217 ZZC OBSERVATION CARE DISCHARGE: CPT | Performed by: PHYSICIAN ASSISTANT

## 2018-10-09 PROCEDURE — 36415 COLL VENOUS BLD VENIPUNCTURE: CPT | Performed by: PHYSICIAN ASSISTANT

## 2018-10-09 PROCEDURE — 85027 COMPLETE CBC AUTOMATED: CPT | Performed by: PHYSICIAN ASSISTANT

## 2018-10-09 PROCEDURE — 93306 TTE W/DOPPLER COMPLETE: CPT

## 2018-10-09 PROCEDURE — G0378 HOSPITAL OBSERVATION PER HR: HCPCS

## 2018-10-09 PROCEDURE — 93306 TTE W/DOPPLER COMPLETE: CPT | Mod: 26 | Performed by: INTERNAL MEDICINE

## 2018-10-09 NOTE — DISCHARGE SUMMARY
[facility] Outpatient / Observation Unit  Discharge Summary        Abebe Elias MRN# [id number]   YOB: 1985 Age: 33 year old     Date of Admission:  10/8/2018  Date of Discharge:  10/9/2018  Admitting Physician:  Ector Ribeiro MD  Discharge Physician: Charlotte Lynn PA-C  Discharging Service: Hospitalist      Primary Provider: No Ref-Primary, Physician  Primary Care Physician Phone Number: None         Primary Discharge Diagnoses:    Abebe Elias is a 33 year old male with a history of Prostatitis, Tobacco Abuse, Marijuana abuse who was admitted on 10/8/18 for dizziness with syncope.    1. Syncope: lifelong symptoms of intermittent lightheadedness with position changes with 4 episodes over the last 4 days with the most recent episode prior to being admitted with ongoing lightheadedness without improvement on own like usual and reason patient came in for evaluation. Reports of previous workup as teenager for this and thought to possibly be related to medication side effects of Seroquel and Wellbutrin that the patient was taking for Schizophrenia and Bipolar disorder. He is no longer on antipsychotics due to not feeling these were helpful for him as well does not follow with psychiatry. Blood pressures initially borderline for positive orthostatics and thus he received IVFs overnight. Patient reports maintaining hydration at home with 10 16 ounce bottles of water per day. No findings on telemetry other than bradycardia overnight and echocardiogram unremarkable for acute cause. Suspect patient's episodes may be related to some underlying autonomic dysfunction which would benefit from tilt table testing as an outpatient but if this is negative his episodes are more likely psychogenic in origin.           Secondary Discharge Diagnoses:     Past Medical History:   Diagnosis Date     Acute tonsillitis     Recurrent, hospitalized 2/05     Epididymitis, bilateral      Epididymitis, left       Epididymitis, right      History of prostatitis 3/23/2017     Prostate infection             Code Status:      Full Code        Brief Hospital Summary:        Reason for your hospital stay       You were admitted for concerns of syncope. No cause was found in the   emergency room for the syncope but we suspect it may be due to mild   orthostatic hypotension and/or autonomic dysfunction. We monitored your   heart overnight with no abnormal findings. You had an echocardiogram done   that showed normal heart function and no valvular abnormalities. Given   your negative work up we are comfortable sending you home with follow up   with your primary care provider for tilt table testing and additional   workup as needed for your ongoing lightheadedness and syncopal episodes.                    Please refer to initial admission history and physical for further details.   Briefly, Abebe Elias was admitted on 10/8/2018 for episode of syncope.  Initial work up in the ED did not reveal evidence of significant cardiac arrhthymias or neurologic abnormalities.  Pt was registered to the Observation Unit for further evaluation.      Pt was placed on telemetry and started on IVF hydration. ECHO was performed to r/o significant valvular dysfunction with results outlined below. Labs reviewed and significant results addressed. On the day of discharge, pt has not had any further episodes of syncope, no significant arrhythmias detected or concerning ECHO findings as the cause of syncope. Vitals were stable and pt was deemed safe for discharge. Medications were reviewed and adjustments made as necessary. Pt is instructed to follow up as below.           Significant Lab During Hospitalization:        Recent Labs  Lab 10/09/18  0521   WBC 6.1   HGB 13.2*   HCT 39.2*   MCV 90          Recent Labs  Lab 10/09/18  0521      POTASSIUM 3.9   CHLORIDE 110*   CO2 27   ANIONGAP 6   GLC 74   BUN 18   CR 1.12   GFRESTIMATED 76    GFRESTBLACK >90   JOSE L 8.2*       Recent Labs  Lab 10/08/18  2150   COLOR Yellow   APPEARANCE Clear   URINEGLC Negative   URINEBILI Negative   URINEKETONE Negative   SG 1.027   UBLD Negative   URINEPH 7.0   PROTEIN Negative   NITRITE Negative   LEUKEST Negative   RBCU 1   WBCU 1              Significant Imaging During Hospitalization:      Results for orders placed or performed during the hospital encounter of 10/08/18   XR Chest 2 Views    Narrative    XR CHEST 2 VW 10/8/2018 3:19 PM    COMPARISON: None.    HISTORY: Syncope.      Impression    IMPRESSION: Cardiac silhouette and pulmonary vasculature are within  normal limits. No focal airspace disease, pleural effusion or  pneumothorax.    CLAUDIA ADEN MD   POC US ECHO LIMITED    Impression    PROCEDURE NOTE: ED BEDSIDE LIMITED ULTRASOUND  Name of the study: Limited Echo  Performed by: Dr. Akbar  Indications: syncope   Body Location / Organs Imaged: Multiple planes of the heart, bilateral lungs.  Findings: Good global function, normal RV dilation, IVC within normal limits,   No pericardial effusion  Interpretation: Good global function without evidence of effusion.  Archiving of images: Images were also saved digitally to the internal hard drive of the ultrasound machine.       Head CT w/o contrast    Narrative    CT OF THE HEAD WITHOUT CONTRAST 10/8/2018 5:42 PM     COMPARISON: Head CT 4/6/2013.    HISTORY:  Lightheadedness.    TECHNIQUE: Axial CT images of the head from the skull base to the  vertex were acquired without IV contrast.    FINDINGS: The ventricles and basal cisterns are within normal limits  in configuration. There is no midline shift. There are no extra-axial  fluid collections.  Gray-white differentiation is well maintained.    No intracranial hemorrhage, mass or recent infarct.    The visualized paranasal sinuses are well-aerated. There is no  mastoiditis. There are no fractures of the visualized bones.      Impression    IMPRESSION:  Normal  "head CT.    Radiation dose for this scan was reduced using automated exposure  control, adjustment of the mA and/or kV according to patient size, or  iterative reconstruction technique.       JUAN PABLO BLACKBURN MD              Pending Results:        Unresulted Labs Ordered in the Past 30 Days of this Admission     Date and Time Order Name Status Description    10/8/2018 1946 Drug screen urine In process             Consultations This Hospital Stay:      No consultations were requested during this admission         Discharge Instructions and Follow-Up:      Follow-up Appointments    Establish care and follow up with primary care provider within 7 days hospital follow up and ongoing workup for lightheadedness.  The following   labs/tests are recommended: tilt table testing for autonomic dysfunction.           Discharge Disposition:      Discharged to home         Discharge Medications:      There are no discharge medications for this patient.          Allergies:         Allergies   Allergen Reactions     Hydrocodone Itching     Also intense itching.     Penicillins      Pseudoephedrine      Vicodin [Hydrocodone-Acetaminophen]          Condition and Physical on Discharge:      Discharge condition: Stable   Vitals: Blood pressure 123/63, pulse 52, temperature 96.9  F (36.1  C), temperature source Oral, resp. rate 18, height 1.88 m (6' 2\"), weight 72.6 kg (160 lb), SpO2 100 %.  160 lbs 0 oz      GENERAL:  Comfortable.  PSYCH: pleasant, oriented, No acute distress.  HEENT:  PERRLA. Normal conjunctiva, normal hearing, nasal mucosa and oropharynx are normal.  NECK:  Supple, no neck vein distention, adenopathy or bruits, normal thyroid.  HEART:  Normal S1, S2 with no murmur, no pericardial rub, gallops or S3 or S4.  LUNGS:  Clear to auscultation, normal respiratory effort. No wheezing, rales or ronchi.  ABDOMEN:  Soft, no hepatosplenomegaly, normal bowel sounds. Non-tender, non distended.   EXTREMITIES:  No pedal edema, +2 " radial and posterior tibial pulses bilateral and equal.  SKIN:  Dry to touch, No rash, wound or ulcerations.  NEUROLOGIC:  CN 2-12 intact, BL 5/5 symmetric upper and lower extremity strength, sensation is intact with no focal deficits.     Charlotte Lynn PA-C

## 2018-10-09 NOTE — PLAN OF CARE
Problem: Patient Care Overview  Goal: Plan of Care/Patient Progress Review  Outcome: Improving  PRIMARY DIAGNOSIS: SYNCOPE/TIA  OUTPATIENT/OBSERVATION GOALS TO BE MET BEFORE DISCHARGE:  1. Orthostatic performed: Yes:          Lying Orthostatic BP: 123/60         Sitting Orthostatic BP: 122/60         Standing Orthostatic BP: 134/70     2. Diagnostic testing complete & at baseline neurologic testing: Yes    3. Cleared by consultants (if involved): N/A    4. Interpretation of cardiac rhythm per telemetry tech: SB    5. Tolerating adequate PO diet and medications: Yes    6. Return to near baseline physical activity or neurologic status: Yes - Asymptomatic    Discharge Planner Nurse   Safe discharge environment identified: Yes  Barriers to discharge: No       Entered by: Sam Gonzáles 10/09/2018 9:00 AM     Please review provider order for any additional goals.   Nurse to notify provider when observation goals have been met and patient is ready for discharge.

## 2018-10-09 NOTE — PROGRESS NOTES
Discharge Planner   Discharge Plans in progress: Yes  Barriers to discharge plan: Met with patient and wife Toro as patient has no PCP listed in chart. Patient currently is not established with a PCP. Patient prefers [facility].  Follow up plan: Patient and wife to call [facility] to establish care with a PCP.        Entered by: Quiana Stuart 10/09/2018 11:49 AM

## 2018-10-09 NOTE — PLAN OF CARE
Problem: Patient Care Overview  Goal: Plan of Care/Patient Progress Review  Outcome: Improving  Patient's After Visit Summary was reviewed with patient and/or spouse.   Patient verbalized understanding of After Visit Summary, recommended follow up and was given an opportunity to ask questions.   Discharge medications sent home with patient/family: No   Discharged with spouse    OBSERVATION patient END time: 1151

## 2018-10-09 NOTE — ED NOTES
Answered pt call light. Pt requesting update on wait times. States he would like to leave if it is going to be much longer. Pt informed of potential wait times and that we are doing our best to get him up to the floor.

## 2018-10-09 NOTE — PLAN OF CARE
Problem: Patient Care Overview  Goal: Plan of Care/Patient Progress Review  Outcome: No Change  PRIMARY DIAGNOSIS: SYNCOPE/LOC  OUTPATIENT/OBSERVATION GOALS TO BE MET BEFORE DISCHARGE:  1. Orthostatic performed: Yes:          Lying Orthostatic BP: 125/63         Sitting Orthostatic BP: 102/58         Standing Orthostatic BP: 117/79     2. Diagnostic testing complete & at baseline neurologic testing: No-Echo in am    3. Cleared by consultants (if involved): N/A    4. Interpretation of cardiac rhythm per telemetry tech: SB/SR 50-60's    5. Tolerating adequate PO diet and medications: Yes    6. Return to near baseline physical activity or neurologic status: Yes    Discharge Planner Nurse   Safe discharge environment identified: Yes  Barriers to discharge: No       Entered by: Whit Garcia 10/09/2018 8:20p.m.      A&Ox4, VSS. Denies pain or SOB. Dizziness at times when up ambulating. SBA for safety. IVF infusing. Echo complete in AM. Will continue to monitor.       Please review provider order for any additional goals.   Nurse to notify provider when observation goals have been met and patient is ready for discharge.

## 2018-10-09 NOTE — PLAN OF CARE
Problem: Patient Care Overview  Goal: Plan of Care/Patient Progress Review  Outcome: No Change  PRIMARY DIAGNOSIS: SYNCOPE/LOC  OUTPATIENT/OBSERVATION GOALS TO BE MET BEFORE DISCHARGE:  1. Orthostatic performed: Yes:          Lying Orthostatic BP: 125/63         Sitting Orthostatic BP: 102/58         Standing Orthostatic BP: 117/79     2. Diagnostic testing complete & at baseline neurologic testing: No-Echo in am    3. Cleared by consultants (if involved): N/A    4. Interpretation of cardiac rhythm per telemetry tech: SB-HR 50's    5. Tolerating adequate PO diet and medications: Yes    6. Return to near baseline physical activity or neurologic status: Yes    Discharge Planner Nurse   Safe discharge environment identified: Yes  Barriers to discharge: No       Entered by: Jessica Duvall 10/08/2018 8:20p.m.     Please review provider order for any additional goals.   Nurse to notify provider when observation goals have been met and patient is ready for discharge.    AOx3. SBA. Neuros WNL. Denies SOB, Chest pain, feeling lightheaded or dizzy.  New IV placed per pt. Request. Running SB-HR 50's per tele. Plan for echo in am.

## 2018-10-09 NOTE — PLAN OF CARE
Problem: Patient Care Overview  Goal: Plan of Care/Patient Progress Review  ROOM # 202-1  Living Situation (if not independent, order SW consult): Home  Facility name:N/A  : Wife (# on board)    Activity level at baseline: Ind  Activity level on admit: SBA      Patient registered to observation; given Patient Bill of Rights; given the opportunity to ask questions about observation status and their plan of care.  Patient has been oriented to the observation room, bathroom and call light is in place.    Discussed discharge goals and expectations with patient/family.

## 2018-10-10 ENCOUNTER — TELEPHONE (OUTPATIENT)
Dept: INTERNAL MEDICINE | Facility: CLINIC | Age: 33
End: 2018-10-10

## 2018-10-10 NOTE — TELEPHONE ENCOUNTER
IP F/U    Date: 10/09/18  Diagnosis: Bradycardia  Is patient active in care coordination? No  Was patient in TCU? No

## 2018-10-10 NOTE — TELEPHONE ENCOUNTER
Pt calls back. He has to go [facility] due to insurance.     He states he feels OK as long as he takes it easy.

## 2019-10-09 ENCOUNTER — TELEPHONE (OUTPATIENT)
Dept: FAMILY MEDICINE | Facility: CLINIC | Age: 34
End: 2019-10-09

## 2019-10-09 NOTE — TELEPHONE ENCOUNTER
"Wife calling to schedule an appointment for her .  She states he has a vein on his penis that has been bothering him, possibly for a few weeks.  She states it is painful and \"hard\".    Advised wife triage will contact patient to further discuss symptoms.  Verified phone number in account is correct number for patient.    Lupis Walters      "

## 2019-10-09 NOTE — TELEPHONE ENCOUNTER
Called patient @ [phone] -     Concern - Hard Vein on Penis  Onset: 2-3 Weeks ago    Description:   Patient stated a few weeks ago ne noticed a few weeks ago a vein popped out on the top of his penis and it is bulged.   It is a very hard vein - more predominant during an erection.     Intensity: 4/10 only during an erection    Progression of Symptoms:  worsening and constant    Accompanying Signs & Symptoms:  Painful during an erection    Previous history of similar problem:   Did have prostate infection ~3 years ago    Precipitating factors:   Worsened by: Erection    Alleviating factors:  Improved by: Nothing    Therapies Tried and outcome: Nothing     DENIES: Urinary symptoms (frequency, urgency, burning, penile discharge), severe swelling, pain in testicles, Flank Pain, inability to urinate, trauma, blood in urine/semen, rash/sores, difficulty maintaining erection, discoloration    Advised OV. Scheduled:   Next 5 appointments (look out 90 days)    Oct 11, 2019  9:20 AM CDT  SHORT with Tobias Baker MD  [facility] ([facility]) [address] [place] [id number] [phone]          Paula Adkins RN  [facility]

## 2019-10-11 ENCOUNTER — OFFICE VISIT (OUTPATIENT)
Dept: FAMILY MEDICINE | Facility: CLINIC | Age: 34
End: 2019-10-11
Payer: COMMERCIAL

## 2019-10-11 VITALS
DIASTOLIC BLOOD PRESSURE: 68 MMHG | WEIGHT: 175 LBS | OXYGEN SATURATION: 96 % | TEMPERATURE: 98.5 F | HEIGHT: 74 IN | BODY MASS INDEX: 22.46 KG/M2 | SYSTOLIC BLOOD PRESSURE: 116 MMHG | HEART RATE: 66 BPM

## 2019-10-11 DIAGNOSIS — Z51.81 MEDICATION MONITORING ENCOUNTER: ICD-10-CM

## 2019-10-11 DIAGNOSIS — F41.9 ANXIETY: ICD-10-CM

## 2019-10-11 DIAGNOSIS — Z87.438 HISTORY OF PROSTATITIS: ICD-10-CM

## 2019-10-11 DIAGNOSIS — N48.81 THROMBOSIS OF SUPERFICIAL VEIN OF PENIS: Primary | ICD-10-CM

## 2019-10-11 PROCEDURE — 99213 OFFICE O/P EST LOW 20 MIN: CPT | Performed by: FAMILY MEDICINE

## 2019-10-11 RX ORDER — GABAPENTIN 300 MG/1
600 CAPSULE ORAL 3 TIMES DAILY
Refills: 1 | COMMUNITY
Start: 2019-10-01 | End: 2022-02-03

## 2019-10-11 ASSESSMENT — MIFFLIN-ST. JEOR: SCORE: 1813.54

## 2019-10-11 NOTE — PROGRESS NOTES
SUBJECTIVE:                                                    Abebe Elias is a 34 year old male who presents to clinic today for the following health issues:    Concern - Hard Vein on distal Penis  Onset: 2-3 Weeks ago    Description:   Patient stated a few weeks ago he noticed a vein popped out on the top of his penis and it is bulging  It is a very hard vein - more predominant during an erection.     Intensity: 4/10 only during an erection    Progression of Symptoms:  worsening and constant    Accompanying Signs & Symptoms:  Painful during an erection    Previous history of similar problem:   Did have prostate infection ~3 years ago    Precipitating factors:   Worsened by: Erection    Alleviating factors:  Improved by: Nothing  Therapies Tried and outcome: Nothing      DENIES: Urinary symptoms (frequency, urgency, burning, penile discharge), severe swelling, pain in testicles, Flank Pain, inability to urinate, trauma, blood in urine/semen, rash/sores, difficulty maintaining erection, discoloration    Depression/Anxiety: Patient's depression/anxiety is moderately controlled. Patient is currently prescribed 600 mg Gabapentin TID for depression/anxiety management. Patient consults with Dr. Cuba of psychiatry for further depression/anxiety management.     Reviewed and updated as needed this visit by Provider       BP Readings from Last 3 Encounters:   10/11/19 116/68   10/09/18 123/63   04/13/17 118/68       body mass index is 22.47 kg/m .    Wt Readings from Last 4 Encounters:   10/11/19 79.4 kg (175 lb)   10/08/18 72.6 kg (160 lb)   04/13/17 78 kg (172 lb)   03/31/17 77.1 kg (170 lb)       Health Maintenance    Health Maintenance Due   Topic Date Due     PREVENTIVE CARE VISIT  1987     HIV SCREENING  08/01/2002     PNEUMOCOCCAL IMMUNIZATION 19-64 MEDIUM RISK (1 of 1 - PPSV23) 08/01/2006     PHQ-2  01/01/2019     DTAP/TDAP/TD IMMUNIZATION (2 - Td) 04/11/2019       Current Problem List    Patient  Active Problem List   Diagnosis     CARDIOVASCULAR SCREENING; LDL GOAL LESS THAN 160     Health Care Home     History of prostatitis     Syncope     Anxiety       Past Medical History    Past Medical History:   Diagnosis Date     Acute tonsillitis     Recurrent, hospitalized 2/05     Anxiety     [facility]     CARDIOVASCULAR SCREENING; LDL GOAL LESS THAN 160      Epididymitis, right      History of prostatitis 3/23/2017       Past Surgical History    Past Surgical History:   Procedure Laterality Date     HAND SURGERY Right 2009    tendon rupture     PENIS SURGERY  1988    hypospadius repair     TONSILLECTOMY & ADENOIDECTOMY  2006       Current Medications    Current Outpatient Medications   Medication Sig Dispense Refill     gabapentin (NEURONTIN) 300 MG capsule Take 600 mg by mouth 3 times daily  1       Allergies    Allergies   Allergen Reactions     Hydrocodone Hives and Itching     Penicillins Hives     Pseudoephedrine Hives       Immunizations    Immunization History   Administered Date(s) Administered     TD (ADULT, 7+) 04/11/2009       Family History    Family History   Problem Relation Age of Onset     Diabetes Maternal Grandmother      Diabetes Maternal Grandfather      Lung Cancer Maternal Grandfather      C.A.D. No family hx of      Hypertension No family hx of        Social History    Social History     Socioeconomic History     Marital status:      Spouse name: Toro     Number of children: 3     Years of education: 14     Highest education level: Not on file   Occupational History     Employer: [employer]     Comment: [place] [employer]   Social Needs     Financial resource strain: Not on file     Food insecurity:     Worry: Not on file     Inability: Not on file     Transportation needs:     Medical: Not on file     Non-medical: Not on file   Tobacco Use     Smoking status: Former Smoker     Packs/day: 0.50     Years: 2.00     Pack years: 1.00     Types: Cigarettes     Last attempt to  "quit: 2016     Years since quittin.8     Smokeless tobacco: Former User     Tobacco comment: 3/4 of pack daily   Substance and Sexual Activity     Alcohol use: No     Alcohol/week: 0.0 standard drinks     Drug use: No     Sexual activity: Yes     Partners: Female   Lifestyle     Physical activity:     Days per week: Not on file     Minutes per session: Not on file     Stress: Not on file   Relationships     Social connections:     Talks on phone: Not on file     Gets together: Not on file     Attends Mormonism service: Not on file     Active member of club or organization: Not on file     Attends meetings of clubs or organizations: Not on file     Relationship status: Not on file     Intimate partner violence:     Fear of current or ex partner: Not on file     Emotionally abused: Not on file     Physically abused: Not on file     Forced sexual activity: Not on file   Other Topics Concern     Parent/sibling w/ CABG, MI or angioplasty before 65F 55M? No   Social History Narrative     Not on file       All above reviewed and updated, all stable unless otherwise noted    Recent labs reviewed    ROS:  Constitutional, HEENT, cardiovascular, pulmonary, GI, , musculoskeletal, neuro, skin, endocrine and psych systems are negative, except as otherwise noted.    OBJECTIVE:                                                    /68   Pulse 66   Temp 98.5  F (36.9  C) (Oral)   Ht 1.88 m (6' 2\")   Wt 79.4 kg (175 lb)   SpO2 96%   BMI 22.47 kg/m    Body mass index is 22.47 kg/m .  GENERAL: healthy, alert and no distress  EYES: Eyes grossly normal to inspection  HENT:ear canals and TM's normal upon viewing with otoscope, nose and mouth without ulcers or lesions upon viewing with otoscope  NECK: no tenderness, no adenopathy, no asymmetry, no masses, no stiffness; thyroid- normal to palpation  RESP: lungs clear to auscultation - no rales, no rhonchi, no wheezes  CV: regular rates and rhythm, normal S1 S2, no S3 or " S4 and no murmur, no click or rub -  ABDOMEN: soft, no tenderness, no  hepatosplenomegaly, no masses, normal bowel sounds  MS: extremities- no gross deformities noted, no edema  SKIN: no suspicious lesions, no rashes  NEURO: strength and tone- normal, sensory exam- grossly normal, mentation- intact, speech- normal, reflexes- symmetric  BACK: no CVA tenderness, no paralumbar tenderness  - male: testicles- normal, no atrophy, no masses;  no inguinal hernias, 2 cm thrombosed vein at the distal penis before the glans  PSYCH: Alert and oriented times 3; speech- coherent , normal rate and volume; able to articulate logical thoughts, able to abstract reason, no tangential thoughts, no hallucinations or delusions, affect- normal    DIAGNOSTICS/PROCEDURES:                                                      No results found for this or any previous visit (from the past 24 hour(s)).     ASSESSMENT:                                                        ICD-10-CM    1. Thrombosis of superficial vein of penis N48.81 UROLOGY ADULT REFERRAL   2. History of prostatitis Z87.438    3. Anxiety F41.9    4. Medication monitoring encounter Z51.81        PLAN:                                                    Discussed treatment/modality options, including risk and benefits he desires:    advised 1 multivitamin per day, advised dentist every 6 months, advised diet and exercise and advised opthalmologist every 1-2 years.     1) Patient presented today with a thrombosed vein on his penis. Patient referred to Dr. Martin of Urology for further evaluation of thrombosed vein on penis.     2) Follow up if symptoms persist or worsen.     3) Patient's depression/anxiety is moderately controlled. Patient is currently prescribed 600 mg Gabapentin TID for depression/anxiety management. Advised continued use. Patient consults with Dr. Cuba of psychiatry for further depression/anxiety management. Recommend continued follow up.     All  diagnosis above reviewed and noted above, otherwise stable.  See [facility] orders for further details.     Return in about 1 week (around 10/18/2019), or if symptoms worsen or fail to improve, for Acute Follow up.    Health Maintenance Due   Topic Date Due     PREVENTIVE CARE VISIT  1987     HIV SCREENING  08/01/2002     PNEUMOCOCCAL IMMUNIZATION 19-64 MEDIUM RISK (1 of 1 - PPSV23) 08/01/2006     PHQ-2  01/01/2019     DTAP/TDAP/TD IMMUNIZATION (2 - Td) 04/11/2019     This document serves as a record of the services and decisions personally performed and made by Tobias Baker MD. It was created on his behalf by Mirza Orr, a trained medical scribe. The creation of this document is based on the provider's statements to the medical scribe.  Mirza Orr October 11, 2019 9:23 AM     The information in this document, created by the medical scribe for me, accurately reflects the services I personally performed and the decisions made by me. I have reviewed and approved this document for accuracy prior to leaving the patient care area.  October 11, 2019            Tobias Baker MD [address]  [phone] Fax

## 2019-10-15 ENCOUNTER — OFFICE VISIT (OUTPATIENT)
Dept: UROLOGY | Facility: CLINIC | Age: 34
End: 2019-10-15
Payer: COMMERCIAL

## 2019-10-15 VITALS
DIASTOLIC BLOOD PRESSURE: 60 MMHG | HEIGHT: 74 IN | OXYGEN SATURATION: 95 % | HEART RATE: 68 BPM | SYSTOLIC BLOOD PRESSURE: 120 MMHG | WEIGHT: 165 LBS | BODY MASS INDEX: 21.17 KG/M2

## 2019-10-15 DIAGNOSIS — N48.89 PENILE MASS: Primary | ICD-10-CM

## 2019-10-15 PROCEDURE — 99213 OFFICE O/P EST LOW 20 MIN: CPT | Performed by: UROLOGY

## 2019-10-15 ASSESSMENT — PAIN SCALES - GENERAL: PAINLEVEL: NO PAIN (0)

## 2019-10-15 ASSESSMENT — MIFFLIN-ST. JEOR: SCORE: 1768.19

## 2019-10-15 NOTE — PROGRESS NOTES
History: it is a great pleasure to see this very pleasant 34-year-old gentleman in follow-up consultation today.  We saw him last 2 years ago with prostatitis.  However the current concern is a small area of firmness on the dorsal aspect of the penis just below the glans which is not painful but which he has noticed and is concerned about.  There apparently is no associated undue trauma  Noticed with no pain during sexual activity.  There are no other symptoms.  His general health is otherwise stable    Past Medical History:   Diagnosis Date     Acute tonsillitis     Recurrent, hospitalized      [facility]     CARDIOVASCULAR SCREENING; LDL GOAL LESS THAN 160      Epididymitis, right      History of prostatitis 3/23/2017       Social History     Socioeconomic History     Marital status:      Spouse name: Toro     Number of children: 3     Years of education: 14     Highest education level: None   Occupational History     Employer: [employer]     Comment: [place] [employer]   Social Needs     Financial resource strain: None     Food insecurity:     Worry: None     Inability: None     Transportation needs:     Medical: None     Non-medical: None   Tobacco Use     Smoking status: Former Smoker     Packs/day: 0.50     Years: 2.00     Pack years: 1.00     Types: Cigarettes     Last attempt to quit: 2016     Years since quittin.8     Smokeless tobacco: Former User     Tobacco comment: 3/4 of pack daily   Substance and Sexual Activity     Alcohol use: No     Alcohol/week: 0.0 standard drinks     Drug use: No     Sexual activity: Yes     Partners: Female   Lifestyle     Physical activity:     Days per week: None     Minutes per session: None     Stress: None   Relationships     Social connections:     Talks on phone: None     Gets together: None     Attends Uatsdin service: None     Active member of club or organization: None     Attends meetings of clubs or organizations: None      "Relationship status: None     Intimate partner violence:     Fear of current or ex partner: None     Emotionally abused: None     Physically abused: None     Forced sexual activity: None   Other Topics Concern     Parent/sibling w/ CABG, MI or angioplasty before 65F 55M? No   Social History Narrative     None       Past Surgical History:   Procedure Laterality Date     HAND SURGERY Right 2009    tendon rupture     PENIS SURGERY  1988    hypospadius repair     TONSILLECTOMY & ADENOIDECTOMY  2006       Family History   Problem Relation Age of Onset     Diabetes Maternal Grandmother      Diabetes Maternal Grandfather      Lung Cancer Maternal Grandfather      C.A.D. No family hx of      Hypertension No family hx of          Current Outpatient Medications:      gabapentin (NEURONTIN) 300 MG capsule, Take 600 mg by mouth 3 times daily, Disp: , Rfl: 1    10 point ROS of systems including Constitutional, Eyes, Respiratory, Cardiovascular, Gastroenterology, Genitourinary, Integumentary, Muscularskeletal, Psychiatric and Neurologic were all negative except for pertinent positives noted in my HPI.    Examination:   /60   Pulse 68   Ht 1.88 m (6' 2\")   Wt 74.8 kg (165 lb)   SpO2 95%   BMI 21.18 kg/m    General Impression: Very pleasant patient in no acute distress, well-oriented in time place and person and quite conversational  Mental Status: normal  HEENT: Extraocular movements intact.  No clinical evidence of jaundice on examination of eyes.  Mucous membranes are unremarkable  Skin: Warm.  No other abnormalities  Respiratory System: Unlabored on room air.  Respiratory cycle normal  Lymph Nodes: there is no evidence of inguinal lymphadenopathy  Back/Flank Tenderness: not examined  Cardiovascular System: No significant peripheral pitting edema  Abdominal Examination: the abdomen is not obese, is nontender and there is no evidence of inguinal hernia  Extremities: the extremities are otherwise unremarkable  Genitial: " "the penis is circumcised, the meatus is of normal size, on the ventral surface of the penis towards the midline just below the frenulum, is a firm areawhich feels very much like a small thrombosed vein.  However it is not tender and there are no other remarkable features.  There is no evidence of inflammation or edema.  The genitalia otherwise unremarkable  Rectal Examination: not examined  Neurologic System: There are no significant acute abnormal neurological signs in the central or peripheral nervous systems    Impression: in my opinion this is a small thrombosed vein in the region of the frenulum.  I am unclear as to the etiolog I think it is something that is likely to gradually dissipate with time.  I'm going to recommend observation only and he can keep examining this personally himself and let me know if any concerning changesdevelop.  In addition to that I did have a discussion with him about regular testicular self-examination, as a 34 years of age, he is still in a risk age group for testicular cancer.  The patient understands our discussion today and will follow my instructions.  I will see him again should further concerns present..    Plan.  I'll see him a p.r.n. basis.  He's been well counseled as to what issues would require me to see him again.    \"This dictation was performed with voice recognition software and may contain errors,  omissions and inadvertent word substitution.\"        "

## 2019-10-15 NOTE — LETTER
10/15/2019       RE: Abebe Elias  [address]  [facility] [address]     Dear Colleague,    Thank you for referring your patient, Abebe Elias, to the [facility] UROLOGY CLINIC [place] at [facility]. Please see a copy of my visit note below.    History: it is a great pleasure to see this very pleasant 34-year-old gentleman in follow-up consultation today.  We saw him last 2 years ago with prostatitis.  However the current concern is a small area of firmness on the dorsal aspect of the penis just below the glans which is not painful but which he has noticed and is concerned about.  There apparently is no associated undue trauma  Noticed with no pain during sexual activity.  There are no other symptoms.  His general health is otherwise stable    Past Medical History:   Diagnosis Date     Acute tonsillitis     Recurrent, hospitalized      [facility]     CARDIOVASCULAR SCREENING; LDL GOAL LESS THAN 160      Epididymitis, right      History of prostatitis 3/23/2017       Social History     Socioeconomic History     Marital status:      Spouse name: Toro     Number of children: 3     Years of education: 14     Highest education level: None   Occupational History     Employer: [employer]     [place]: [place] [employer]   Social Needs     Financial resource strain: None     Food insecurity:     Worry: None     Inability: None     Transportation needs:     Medical: None     Non-medical: None   Tobacco Use     Smoking status: Former Smoker     Packs/day: 0.50     Years: 2.00     Pack years: 1.00     Types: Cigarettes     Last attempt to quit: 2016     Years since quittin.8     Smokeless tobacco: Former User     Tobacco comment: 3/4 of pack daily   Substance and Sexual Activity     Alcohol use: No     Alcohol/week: 0.0 standard drinks     Drug use: No     Sexual activity: Yes     Partners: Female   Lifestyle      "Physical activity:     Days per week: None     Minutes per session: None     Stress: None   Relationships     Social connections:     Talks on phone: None     Gets together: None     Attends Sabianism service: None     Active member of club or organization: None     Attends meetings of clubs or organizations: None     Relationship status: None     Intimate partner violence:     Fear of current or ex partner: None     Emotionally abused: None     Physically abused: None     Forced sexual activity: None   Other Topics Concern     Parent/sibling w/ CABG, MI or angioplasty before 65F 55M? No   Social History Narrative     None       Past Surgical History:   Procedure Laterality Date     HAND SURGERY Right 2009    tendon rupture     PENIS SURGERY  1988    hypospadius repair     TONSILLECTOMY & ADENOIDECTOMY  2006       Family History   Problem Relation Age of Onset     Diabetes Maternal Grandmother      Diabetes Maternal Grandfather      Lung Cancer Maternal Grandfather      C.A.D. No family hx of      Hypertension No family hx of          Current Outpatient Medications:      gabapentin (NEURONTIN) 300 MG capsule, Take 600 mg by mouth 3 times daily, Disp: , Rfl: 1    10 point ROS of systems including Constitutional, Eyes, Respiratory, Cardiovascular, Gastroenterology, Genitourinary, Integumentary, Muscularskeletal, Psychiatric and Neurologic were all negative except for pertinent positives noted in my HPI.    Examination:   /60   Pulse 68   Ht 1.88 m (6' 2\")   Wt 74.8 kg (165 lb)   SpO2 95%   BMI 21.18 kg/m     General Impression: Very pleasant patient in no acute distress, well-oriented in time place and person and quite conversational  Mental Status: normal  HEENT: Extraocular movements intact.  No clinical evidence of jaundice on examination of eyes.  Mucous membranes are unremarkable  Skin: Warm.  No other abnormalities  Respiratory System: Unlabored on room air.  Respiratory cycle normal  Lymph Nodes: " "there is no evidence of inguinal lymphadenopathy  Back/Flank Tenderness: not examined  Cardiovascular System: No significant peripheral pitting edema  Abdominal Examination: the abdomen is not obese, is nontender and there is no evidence of inguinal hernia  Extremities: the extremities are otherwise unremarkable  Genitial: the penis is circumcised, the meatus is of normal size, on the ventral surface of the penis towards the midline just below the frenulum, is a firm areawhich feels very much like a small thrombosed vein.  However it is not tender and there are no other remarkable features.  There is no evidence of inflammation or edema.  The genitalia otherwise unremarkable  Rectal Examination: not examined  Neurologic System: There are no significant acute abnormal neurological signs in the central or peripheral nervous systems    Impression: in my opinion this is a small thrombosed vein in the region of the frenulum.  I am unclear as to the etiolog I think it is something that is likely to gradually dissipate with time.  I'm going to recommend observation only and he can keep examining this personally himself and let me know if any concerning changesdevelop.  In addition to that I did have a discussion with him about regular testicular self-examination, as a 34 years of age, he is still in a risk age group for testicular cancer.  The patient understands our discussion today and will follow my instructions.  I will see him again should further concerns present..    Plan.  I'll see him a p.r.n. basis.  He's been well counseled as to what issues would require me to see him again.    \"This dictation was performed with voice recognition software and may contain errors,  omissions and inadvertent word substitution.\"          Again, thank you for allowing me to participate in the care of your patient.      Sincerely,    Krzysztof Martin MD      "

## 2019-10-15 NOTE — LETTER
Date:October 16, 2019      Patient was self referred, no letter generated. Do not send.        [facility] Health Information

## 2019-10-15 NOTE — NURSING NOTE
Chief Complaint   Patient presents with     Clinic Care Coordination - Follow-up     Pt here for thrombosed vein on penis     Shoshana Worrell, GABO

## 2020-02-16 ENCOUNTER — HEALTH MAINTENANCE LETTER (OUTPATIENT)
Age: 35
End: 2020-02-16

## 2020-04-28 ENCOUNTER — TELEPHONE (OUTPATIENT)
Dept: FAMILY MEDICINE | Facility: CLINIC | Age: 35
End: 2020-04-28

## 2020-04-28 DIAGNOSIS — Z87.438 HISTORY OF PROSTATITIS: Primary | ICD-10-CM

## 2020-04-28 DIAGNOSIS — N48.81 THROMBOSIS OF SUPERFICIAL VEIN OF PENIS: ICD-10-CM

## 2020-04-28 NOTE — TELEPHONE ENCOUNTER
Reason for Call: Request for an order or referral:    Order or referral being requested: Urology    Date needed: as soon as possible    Has the patient been seen by the PCP for this problem? YES    Additional comments: Pt need to go to his Urologist but they will not have him come without a referral from PCP    Phone number Patient can be reached at:  Home number on file [phone] (home)    Best Time:      Can we leave a detailed message on this number?  YES    Call taken on 4/28/2020 at 8:35 AM by Cielo Salas

## 2020-04-28 NOTE — TELEPHONE ENCOUNTER
Referral is in chart but does not suffice as they are requesting a new referral per patient.  He said he called and they want a new referral.    New referral entered.      Patient notified via phone.      RORO tSallings, RN, [facility]  Office: [phone]  Fax: [phone]

## 2020-05-07 ENCOUNTER — VIRTUAL VISIT (OUTPATIENT)
Dept: UROLOGY | Facility: CLINIC | Age: 35
End: 2020-05-07
Attending: FAMILY MEDICINE
Payer: COMMERCIAL

## 2020-05-07 DIAGNOSIS — N45.1 EPIDIDYMITIS: Primary | ICD-10-CM

## 2020-05-07 PROCEDURE — 99213 OFFICE O/P EST LOW 20 MIN: CPT | Mod: 95 | Performed by: UROLOGY

## 2020-05-07 RX ORDER — DOXYCYCLINE 100 MG/1
100 TABLET ORAL 2 TIMES DAILY
Qty: 84 TABLET | Refills: 0 | Status: SHIPPED | OUTPATIENT
Start: 2020-05-07 | End: 2020-06-18

## 2020-05-07 RX ORDER — PROPRANOLOL HYDROCHLORIDE 10 MG/1
TABLET ORAL
COMMUNITY
Start: 2020-04-22 | End: 2021-05-31

## 2020-05-07 RX ORDER — LAMOTRIGINE 100 MG/1
TABLET ORAL
COMMUNITY
Start: 2020-05-05 | End: 2022-02-03

## 2020-05-07 NOTE — PROGRESS NOTES
"Abebe Elias is a 34 year old male who is being evaluated via a billable telephone visit.      The patient has been notified of following:     \"This telephone visit will be conducted via a call between you and your physician/provider. We have found that certain health care needs can be provided without the need for a physical exam.  This service lets us provide the care you need with a short phone conversation.  If a prescription is necessary we can send it directly to your pharmacy.  If lab work is needed we can place an order for that and you can then stop by our lab to have the test done at a later time.    Telephone visits are billed at different rates depending on your insurance coverage. During this emergency period, for some insurers they may be billed the same as an in-person visit.  Please reach out to your insurance provider with any questions.    If during the course of the call the physician/provider feels a telephone visit is not appropriate, you will not be charged for this service.\"    Patient has given verbal consent for Telephone visit?  Yes    What phone number would you like to be contacted at? [phone]    How would you like to obtain your AVS? MyChart     History: It is a pleasure to hear from this pleasant 34-year-old gentleman.  I am hearing discussion with him today with a telephone consultation in follow-up consultation.  He has a previous history when seen over 3 years ago with prostatitis and about a year ago seen because he was concerned about a small area of firmness on the dorsal aspect of the penis just below the glans.  After careful examination on this occasion it was decided this was most likely a small thrombosed vein.  Since then he has been in good health.  Recently however he is noticed some sensation of discomfort in the in the spermatic cord and at the base of the testicle on the right side but also on the left and a sensation of the testicles are retracting more than " they have in the past.  His urine stream is been satisfactory without evidence of dysuria, frequency, urgency or any other significant related symptoms.  His general health seems stable.  He does however take medication for anxiety as noted below.  He is performing regular testicular self-examination    Past Medical History:   Diagnosis Date     Acute tonsillitis     Recurrent, hospitalized 2/05     [facility] [place], operated by [facility]     CARDIOVASCULAR SCREENING; LDL GOAL LESS THAN 160      Epididymitis, right      History of prostatitis 3/23/2017       Social History     Socioeconomic History     Marital status:      Spouse name: Toro     Number of children: 3     Years of education: 14     Highest education level: None   Occupational History     Employer: [employer]     Comment: [place] [employer]   Social Needs     Financial resource strain: None     Food insecurity     Worry: None     Inability: None     Transportation needs     Medical: None     Non-medical: None   Tobacco Use     Smoking status: Former Smoker     Packs/day: 0.50     Years: 2.00     Pack years: 1.00     Types: Cigarettes     Last attempt to quit: 12/4/2016     Years since quitting: 3.4     Smokeless tobacco: Former User     Tobacco comment: 3/4 of pack daily   Substance and Sexual Activity     Alcohol use: No     Alcohol/week: 0.0 standard drinks     Drug use: No     Sexual activity: Yes     Partners: Female   Lifestyle     Physical activity     Days per week: None     Minutes per session: None     Stress: None   Relationships     Social connections     Talks on phone: None     Gets together: None     Attends Zoroastrian service: None     Active member of club or organization: None     Attends meetings of clubs or organizations: None     Relationship status: None     Intimate partner violence     Fear of current or ex partner: None     Emotionally abused: None     Physically abused: None     Forced sexual activity: None   Other Topics  Concern     Parent/sibling w/ CABG, MI or angioplasty before 65F 55M? No   Social History Narrative     None       Past Surgical History:   Procedure Laterality Date     HAND SURGERY Right 2009    tendon rupture     PENIS SURGERY  1988    hypospadius repair     TONSILLECTOMY & ADENOIDECTOMY  2006       Family History   Problem Relation Age of Onset     Diabetes Maternal Grandmother      Diabetes Maternal Grandfather      Lung Cancer Maternal Grandfather      C.A.D. No family hx of      Hypertension No family hx of          Current Outpatient Medications:      doxycycline monohydrate (ADOXA) 100 MG tablet, Take 1 tablet (100 mg) by mouth 2 times daily, Disp: 84 tablet, Rfl: 0     gabapentin (NEURONTIN) 300 MG capsule, Take 600 mg by mouth 3 times daily, Disp: , Rfl: 1     lamoTRIgine (LAMICTAL) 100 MG tablet, , Disp: , Rfl:      propranolol (INDERAL) 10 MG tablet, , Disp: , Rfl:     10 point ROS of systems including Constitutional, Eyes, Respiratory, Cardiovascular, Gastroenterology, Genitourinary, Integumentary, Muscularskeletal, Psychiatric and Neurologic were all negative except for pertinent positives noted in my HPI.    Examination:   There were no vitals taken for this visit.  General Impression: Very pleasant patient in no acute distress, well-oriented in time place and person and quite conversational as based on our telephone consultation  Mental Status: Seems quite normal although there is a history of anxiety, based on our telephone consultation    Impression: We had a careful discussion about these issues today.  1.  Based on our discussion he may have mild epididymitis and this could be related to mild prostatitis because of his past history.  I have recommended therefore that he try and abstain from caffeine, avoid cranberry juice, take regular warm baths in the evening, and I am going to start him on doxycycline 100 mg twice a day for 6 weeks to see if this will resolve.  I do not think this is a  serious situation.  2.  However I have explained to him that regular testicular self-examination is important in his age group i.e. at 34 years of age as the commonest form of cancer in men at this age is testis cancer.  I do not think there is any evidence of this based on our discussion, but he will need to continue examination of the testes on a monthly basis.  In addition I would like to examine the situation in the office in about 3 months if things have not resolved and when the coronavirus situation has we hope improved to satisfy myself that there is no other significant abnormality which would be of concern.    I did carefully explain all of this to the patient in detail today.  I would recommend we start him on doxycycline 100 mg twice a day for the next 6 weeks and I have asked him to contact me should there be concerns in the interim.  I answered all his questions    Plan: 3 months for examination    Time: 16 minutes      Phone call duration: 16 minutes    Krzysztfo Martin MD

## 2020-05-11 DIAGNOSIS — N45.1 EPIDIDYMITIS: Primary | ICD-10-CM

## 2020-05-11 RX ORDER — DOXYCYCLINE HYCLATE 100 MG
100 TABLET ORAL 2 TIMES DAILY
Qty: 84 TABLET | Refills: 0 | Status: SHIPPED | OUTPATIENT
Start: 2020-05-11 | End: 2022-02-03

## 2020-11-22 ENCOUNTER — HEALTH MAINTENANCE LETTER (OUTPATIENT)
Age: 35
End: 2020-11-22

## 2021-04-04 ENCOUNTER — HEALTH MAINTENANCE LETTER (OUTPATIENT)
Age: 36
End: 2021-04-04

## 2021-05-31 ENCOUNTER — OFFICE VISIT (OUTPATIENT)
Dept: URGENT CARE | Facility: URGENT CARE | Age: 36
End: 2021-05-31
Payer: COMMERCIAL

## 2021-05-31 ENCOUNTER — ANCILLARY PROCEDURE (OUTPATIENT)
Dept: GENERAL RADIOLOGY | Facility: CLINIC | Age: 36
End: 2021-05-31
Attending: PHYSICIAN ASSISTANT
Payer: COMMERCIAL

## 2021-05-31 VITALS
WEIGHT: 165 LBS | DIASTOLIC BLOOD PRESSURE: 78 MMHG | TEMPERATURE: 98.6 F | BODY MASS INDEX: 21.18 KG/M2 | HEART RATE: 66 BPM | RESPIRATION RATE: 20 BRPM | OXYGEN SATURATION: 99 % | SYSTOLIC BLOOD PRESSURE: 118 MMHG

## 2021-05-31 DIAGNOSIS — M62.830 BACK MUSCLE SPASM: ICD-10-CM

## 2021-05-31 DIAGNOSIS — M54.41 ACUTE BILATERAL LOW BACK PAIN WITH RIGHT-SIDED SCIATICA: Primary | ICD-10-CM

## 2021-05-31 PROCEDURE — 72100 X-RAY EXAM L-S SPINE 2/3 VWS: CPT | Performed by: RADIOLOGY

## 2021-05-31 PROCEDURE — 99204 OFFICE O/P NEW MOD 45 MIN: CPT | Performed by: PHYSICIAN ASSISTANT

## 2021-05-31 RX ORDER — OXYCODONE AND ACETAMINOPHEN 5; 325 MG/1; MG/1
1 TABLET ORAL EVERY 6 HOURS PRN
Qty: 12 TABLET | Refills: 0 | Status: SHIPPED | OUTPATIENT
Start: 2021-05-31 | End: 2021-06-03

## 2021-05-31 RX ORDER — METHOCARBAMOL 750 MG/1
750 TABLET, FILM COATED ORAL 4 TIMES DAILY PRN
Qty: 30 TABLET | Refills: 0 | Status: SHIPPED | OUTPATIENT
Start: 2021-05-31 | End: 2022-02-03

## 2021-05-31 RX ORDER — METHYLPREDNISOLONE 4 MG
TABLET, DOSE PACK ORAL
Qty: 21 TABLET | Refills: 0 | Status: SHIPPED | OUTPATIENT
Start: 2021-05-31 | End: 2022-02-03

## 2021-05-31 ASSESSMENT — PAIN SCALES - GENERAL: PAINLEVEL: WORST PAIN (10)

## 2021-05-31 NOTE — PROGRESS NOTES
Assessment & Plan     Acute bilateral low back pain with right-sided sciatica  Lumbar xray Negative for acute findings, read by Terrance Mccauley PA-C [facility] at time of visit.  Medrol for inflammation, DDD  Percocet for pain  - XR Lumbar Spine 2/3 Views  - methylPREDNISolone (MEDROL DOSEPAK) 4 MG tablet therapy pack; Follow package instructions  - oxyCODONE-acetaminophen (PERCOCET) 5-325 MG tablet; Take 1 tablet by mouth every 6 hours as needed for pain    Back muscle spasm  Robaxin for muscle relaxation  Alternate ice and warm moist compresses  - XR Lumbar Spine 2/3 Views  - methocarbamol (ROBAXIN) 750 MG tablet; Take 1 tablet (750 mg) by mouth 4 times daily as needed       Follow up as needed    Terrance Mccauley PA-C  [facility] URGENT CARE ERWIN Lomas is a 35 year old who presents for the following health issues     HPI     Right side low back pain  Muscle spasms  Pain referring down right leg    Review of Systems   Constitutional, HEENT, cardiovascular, pulmonary, gi and gu systems are negative, except as otherwise noted.      Objective    /78   Pulse 66   Temp 98.6  F (37  C) (Tympanic)   Resp 20   Wt 74.8 kg (165 lb)   SpO2 99%   BMI 21.18 kg/m    Body mass index is 21.18 kg/m .  Physical Exam   GENERAL: healthy, alert and no distress  EYES: Eyes grossly normal to inspection, PERRL and conjunctivae and sclerae normal  HENT: ear canals and TM's normal, nose and mouth without ulcers or lesions  NECK: no adenopathy, no asymmetry, masses, or scars and thyroid normal to palpation  RESP: lungs clear to auscultation - no rales, rhonchi or wheezes  CV: regular rate and rhythm, normal S1 S2, no S3 or S4, no murmur, click or rub, no peripheral edema and peripheral pulses strong  ABDOMEN: soft, nontender, no hepatosplenomegaly, no masses and bowel sounds normal  MS: Positive for tenderness, tightness and muscle spasms  SKIN: no suspicious lesions or rashes  NEURO: Normal strength and tone,  mentation intact and speech normal  PSYCH: mentation appears normal, affect normal/bright    Xray - Reviewed and interpreted by me.  Negative for acute findings, read by Terrance DE at time of visit.

## 2021-09-18 ENCOUNTER — HEALTH MAINTENANCE LETTER (OUTPATIENT)
Age: 36
End: 2021-09-18

## 2022-02-03 ENCOUNTER — ANCILLARY PROCEDURE (OUTPATIENT)
Dept: GENERAL RADIOLOGY | Facility: CLINIC | Age: 37
End: 2022-02-03
Attending: NURSE PRACTITIONER
Payer: COMMERCIAL

## 2022-02-03 ENCOUNTER — OFFICE VISIT (OUTPATIENT)
Dept: FAMILY MEDICINE | Facility: CLINIC | Age: 37
End: 2022-02-03
Payer: COMMERCIAL

## 2022-02-03 VITALS
WEIGHT: 173 LBS | HEART RATE: 67 BPM | TEMPERATURE: 98.1 F | HEIGHT: 74 IN | SYSTOLIC BLOOD PRESSURE: 114 MMHG | DIASTOLIC BLOOD PRESSURE: 68 MMHG | BODY MASS INDEX: 22.2 KG/M2 | OXYGEN SATURATION: 100 %

## 2022-02-03 DIAGNOSIS — R07.81 RIB PAIN: ICD-10-CM

## 2022-02-03 DIAGNOSIS — R07.81 RIB PAIN: Primary | ICD-10-CM

## 2022-02-03 PROCEDURE — 71101 X-RAY EXAM UNILAT RIBS/CHEST: CPT | Mod: LT | Performed by: RADIOLOGY

## 2022-02-03 PROCEDURE — 99214 OFFICE O/P EST MOD 30 MIN: CPT | Performed by: NURSE PRACTITIONER

## 2022-02-03 RX ORDER — LORAZEPAM 0.5 MG/1
TABLET ORAL
COMMUNITY
Start: 2022-01-13 | End: 2023-04-03

## 2022-02-03 RX ORDER — VENLAFAXINE HYDROCHLORIDE 150 MG/1
CAPSULE, EXTENDED RELEASE ORAL
COMMUNITY
Start: 2022-01-13

## 2022-02-03 RX ORDER — LAMOTRIGINE 200 MG/1
200 TABLET ORAL 2 TIMES DAILY
COMMUNITY
Start: 2022-01-13 | End: 2023-04-03

## 2022-02-03 RX ORDER — CYCLOBENZAPRINE HCL 5 MG
5 TABLET ORAL 3 TIMES DAILY PRN
Qty: 20 TABLET | Refills: 0 | Status: SHIPPED | OUTPATIENT
Start: 2022-02-03 | End: 2023-03-07

## 2022-02-03 RX ORDER — NAPROXEN 500 MG/1
500 TABLET ORAL 2 TIMES DAILY WITH MEALS
Qty: 60 TABLET | Refills: 1 | Status: SHIPPED | OUTPATIENT
Start: 2022-02-03 | End: 2023-03-07

## 2022-02-03 ASSESSMENT — PAIN SCALES - GENERAL: PAINLEVEL: MODERATE PAIN (4)

## 2022-02-03 ASSESSMENT — MIFFLIN-ST. JEOR: SCORE: 1794.47

## 2022-02-03 NOTE — PROGRESS NOTES
Assessment & Plan     Abebe was seen today for rib pain.    Diagnoses and all orders for this visit:    Rib pain  Contusion to left rib cage versus costochondritis versus musculoskeletal strain.    Schedule Naproxen 2 times daily (every 12 hours) with food for 5-7 days.  And may take Acetaminophen, 1,000 mg every 6 hours as needed.  Muscle relaxant as needed.  Ice/heat application 3-4 times daily for 20 minutes.    -     XR Ribs & Chest Left G/E 3 Views; Future - no acute bony abnormalities or fractures.    -     naproxen (NAPROSYN) 500 MG tablet; Take 1 tablet (500 mg) by mouth 2 times daily (with meals)  -     cyclobenzaprine (FLEXERIL) 5 MG tablet; Take 1 tablet (5 mg) by mouth 3 times daily as needed for muscle spasms      30 minutes spent on the date of the encounter doing chart review, history and exam, documentation and further activities per the note      Return in about 1 week (around 2/10/2022) for No improvement or sooner with worsening symptoms.    Pat Shaver, IDA [facility]   Abebe is a 36 year old who presents for the following health issues:      HPI     Concern - rib pain     Onset of left mid chest pain 1 week ago, worsening of pain since that time.      Aggravated with movement, taking a deep breath.    Difficulty with sleeping at night due to pain, positioning.      Has been taking Advil, 400 mg every 4 hours - without much improvement.    Tried massage without much improvement.    No known alleviating factors.  Only minimal improvement with ice application.      No shortness of breath.  Denies palpitations.    No recent URI.      Onset:  1 week   Description: mid chest to lt  side and into back  Constant 4/10 and 8/10 with movement   Intensity: moderate  Progression of Symptoms:  worsening  Accompanying Signs & Symptoms: hurts to breath  And move/turn to side   Therapies tried and outcome: ibuprofen and ice     Pt notes having a fall 2 wks  "ago while ice fishing was in the top bunk of the ice house   Slightly sore after fall, but didn't have chest pain.        Review of Systems   Constitutional, HEENT, cardiovascular, pulmonary, gi and gu systems are negative, except as otherwise noted.      Objective    /68   Pulse 67   Temp 98.1  F (36.7  C) (Tympanic)   Ht 1.88 m (6' 2\")   Wt 78.5 kg (173 lb)   SpO2 100%   BMI 22.21 kg/m    Body mass index is 22.21 kg/m .  Physical Exam   GENERAL: healthy, alert and no distress  RESP: lungs clear to auscultation - no rales, rhonchi or wheezes  CV: regular rate and rhythm, normal S1 S2, no S3 or S4, no murmur, click or rub, no peripheral edema  Chest wall:  Left chest wall with diffuse tenderness to palpation, no erythema, swelling or ecchymosis.    PSYCH: mentation appears normal, affect normal/bright            "

## 2022-02-03 NOTE — PATIENT INSTRUCTIONS
Abebe was seen today for rib pain.    Diagnoses and all orders for this visit:    Rib pain  -     XR Ribs & Chest Left G/E 3 Views; Future  -     naproxen (NAPROSYN) 500 MG tablet; Take 1 tablet (500 mg) by mouth 2 times daily (with meals)  Schedule 2 times daily (every 12 hours) with food for 5-7 days.  And may take Acetaminophen, 1,000 mg every 6 hours as needed.    -     cyclobenzaprine (FLEXERIL) 5 MG tablet; Take 1 tablet (5 mg) by mouth 3 times daily as needed for muscle spasms

## 2022-02-03 NOTE — RESULT ENCOUNTER NOTE
Dear Abebe,     The radiologist read your x-ray and noted no rib fractures on your x-ray.    See below for the reading:      XR RIBS & CHEST LT 3VW 2/3/2022 10:12 AM     HISTORY: Rib pain     FINDINGS:   Heart size and pulmonary vascularity normal. Both lungs clear. No left  rib fractures.        Please send a [employer] message or call [phone]  if you have any questions.      Pat Shaver, APRN, CNP  [facility]    If you have further questions about the interpretation of your labs, [email or web address].[email or web address] is a good website to check out for further information.

## 2022-04-27 ENCOUNTER — VIRTUAL VISIT (OUTPATIENT)
Dept: INTERNAL MEDICINE | Facility: CLINIC | Age: 37
End: 2022-04-27
Payer: COMMERCIAL

## 2022-04-27 DIAGNOSIS — J20.8 VIRAL BRONCHITIS: Primary | ICD-10-CM

## 2022-04-27 PROCEDURE — 99213 OFFICE O/P EST LOW 20 MIN: CPT | Mod: 95 | Performed by: INTERNAL MEDICINE

## 2022-04-27 ASSESSMENT — ENCOUNTER SYMPTOMS: COUGH: 1

## 2022-04-27 NOTE — PROGRESS NOTES
Abebe is a 36 year old who is being evaluated via a billable video visit.      How would you like to obtain your AVS? MyChart  If the video visit is dropped, the invitation should be resent by: Text to cell phone: [phone]  Will anyone else be joining your video visit? No      Video Start Time: 3:38 PM    Assessment & Plan     Viral bronchitis  He has been sick for only a day, no fevers. Negative covid. Wife was sick. Green sputum.  Probably a virus, give it time, should improve.  Will do antibiotics if not improving. Offered albuterol but will wait.                    No follow-ups on file.    Dorian Villatoro MD  [facility]   Abebe is a 36 year old who presents for the following health issues     Cough       Acute Illness  Acute illness concerns: cough, took negative covid test  Onset/Duration: Yesterday evening   Symptoms:  Fever: no  Chills/Sweats: YES  Headache (location?): YES  Sinus Pressure: YES  Conjunctivitis:  no  Ear Pain: no  Rhinorrhea: YES  Congestion: YES  Sore Throat: no  Cough: YES-productive of green sputum  Wheeze: no  Decreased Appetite: no  Nausea: no  Vomiting: no  Diarrhea: no  Dysuria/Freq.: no  Dysuria or Hematuria: no  Fatigue/Achiness: YES  Sick/Strep Exposure: YES- with was sick  Therapies tried and outcome: tylenol     He is sick, wife was sick last week. Covid was negative today for him.    No fevers, no loss of taste or smell. Had Covid in December 2021. Was vaccinated.        Review of Systems   Respiratory: Positive for cough.             Objective           Vitals:  No vitals were obtained today due to virtual visit.    Physical Exam   GENERAL: Healthy, alert and no distress  EYES: Eyes grossly normal to inspection.  No discharge or erythema, or obvious scleral/conjunctival abnormalities.  RESP: No audible wheeze, cough, or visible cyanosis.  No visible retractions or increased work of breathing.    SKIN: Visible skin clear. No significant  rash, abnormal pigmentation or lesions.  NEURO: Cranial nerves grossly intact.  Mentation and speech appropriate for age.  PSYCH: Mentation appears normal, affect normal/bright, judgement and insight intact, normal speech and appearance well-groomed.                Video-Visit Details    Type of service:  Video Visit    Video End Time:3:40 PM    Originating Location (pt. Location): Home    Distant Location (provider location):  [facility]     Platform used for Video Visit: [employer]

## 2022-04-30 ENCOUNTER — HEALTH MAINTENANCE LETTER (OUTPATIENT)
Age: 37
End: 2022-04-30

## 2022-11-20 ENCOUNTER — HEALTH MAINTENANCE LETTER (OUTPATIENT)
Age: 37
End: 2022-11-20

## 2022-11-22 ENCOUNTER — TRANSFERRED RECORDS (OUTPATIENT)
Dept: HEALTH INFORMATION MANAGEMENT | Facility: CLINIC | Age: 37
End: 2022-11-22

## 2022-11-22 ENCOUNTER — ALLIED HEALTH/NURSE VISIT (OUTPATIENT)
Dept: UROLOGY | Facility: CLINIC | Age: 37
End: 2022-11-22
Payer: COMMERCIAL

## 2022-11-22 ENCOUNTER — OFFICE VISIT (OUTPATIENT)
Dept: UROLOGY | Facility: CLINIC | Age: 37
End: 2022-11-22
Payer: COMMERCIAL

## 2022-11-22 ENCOUNTER — TELEPHONE (OUTPATIENT)
Dept: UROLOGY | Facility: CLINIC | Age: 37
End: 2022-11-22

## 2022-11-22 VITALS
SYSTOLIC BLOOD PRESSURE: 113 MMHG | HEIGHT: 74 IN | WEIGHT: 165 LBS | DIASTOLIC BLOOD PRESSURE: 72 MMHG | BODY MASS INDEX: 21.17 KG/M2

## 2022-11-22 DIAGNOSIS — N41.0 ACUTE PROSTATITIS: ICD-10-CM

## 2022-11-22 DIAGNOSIS — N42.81 PROSTATE PAIN: Primary | ICD-10-CM

## 2022-11-22 LAB
ALBUMIN UR-MCNC: NEGATIVE MG/DL
APPEARANCE UR: CLEAR
BILIRUB UR QL STRIP: NEGATIVE
COLOR UR AUTO: YELLOW
GLUCOSE UR STRIP-MCNC: NEGATIVE MG/DL
HGB UR QL STRIP: NEGATIVE
KETONES UR STRIP-MCNC: ABNORMAL MG/DL
LEUKOCYTE ESTERASE UR QL STRIP: NEGATIVE
NITRATE UR QL: NEGATIVE
PH UR STRIP: 5.5 [PH] (ref 5–7)
PR INTERVAL - MUSE: 0
SP GR UR STRIP: 1.02 (ref 1–1.03)
UROBILINOGEN UR STRIP-ACNC: 1 E.U./DL

## 2022-11-22 PROCEDURE — 99214 OFFICE O/P EST MOD 30 MIN: CPT | Performed by: PHYSICIAN ASSISTANT

## 2022-11-22 PROCEDURE — 87070 CULTURE OTHR SPECIMN AEROBIC: CPT | Performed by: PHYSICIAN ASSISTANT

## 2022-11-22 PROCEDURE — 81003 URINALYSIS AUTO W/O SCOPE: CPT | Mod: QW | Performed by: PHYSICIAN ASSISTANT

## 2022-11-22 PROCEDURE — 51798 US URINE CAPACITY MEASURE: CPT

## 2022-11-22 PROCEDURE — 87109 MYCOPLASMA: CPT | Performed by: PHYSICIAN ASSISTANT

## 2022-11-22 RX ORDER — DOXYCYCLINE 100 MG/1
100 CAPSULE ORAL 2 TIMES DAILY
Qty: 56 CAPSULE | Refills: 0 | Status: SHIPPED | OUTPATIENT
Start: 2022-11-22 | End: 2023-03-07

## 2022-11-22 ASSESSMENT — PAIN SCALES - GENERAL: PAINLEVEL: MODERATE PAIN (4)

## 2022-11-22 NOTE — PROGRESS NOTES
Abebe Elias comes into clinic today at the request of  Ordering Provider for PVR.    Patient presents to clinic with pain in lower abdomen and also prostate area according to patient. Patient was brought in today, to make sure he is emptying his bladder. Patient voided in the bathroom before scanning his bladder. Bladder scan via ultrasound was 0-ml. Patient appears to be emptying well. Patient was recommended to stop at  to reestablish care with a PA or MD for possible prostatitis.       This service provided today was under the supervising provider of the day Dr. Seo, who was available if needed.    Simran Daly LPN

## 2022-11-22 NOTE — PATIENT INSTRUCTIONS
Will send urine for Ureaplasma and Mycoplasma culture today.  If positive, will treat with culture specific antibiotics.     Will send you with an ejaculate culture kit.  Complete and drop off.  Then, start doxycyline 100 mg twice daily for 4 weeks.  Will follow culture and may need to change antibiotics.    In the interim, can do Sitz baths, ice, heat, and schedule NSAIDs for up to 2 weeks.    If persistent issues, may need to consider repeat cystoscopy, MRI of the prostate, pelvic PT, and/or referral to Dr. Taylor.    - Warning signs discussed including fevers, chills, gross hematuria, severe pain that is refractory to medications or uncontrolled nausea and vomiting, which should prompt more urgent evaluation. Patient understands to proceed to the ER should these warning signs occur outside of clinic hours.

## 2022-11-22 NOTE — TELEPHONE ENCOUNTER
[facility] Call Center    Phone Message    May a detailed message be left on voicemail: yes     Reason for Call: Symptoms or Concerns     If patient has red-flag symptoms, warm transfer to triage line    Current symptom or concern: a lot of pain where hes had prostatitis before - directly above penis where bladder is - trouble going to the bathroom - worse and worse - pain is the issue at the moment    Symptoms have been present for:  Couple month(s), pain is getting worse    Has patient previously been seen for this? Yes    By Veronica    Are there any new or worsening symptoms? Yes: pain is getting worse      Action Taken: Message routed to:  Other: Uro    Travel Screening: Not Applicable

## 2022-11-22 NOTE — PROGRESS NOTES
Subjective      CHIEF COMPLAINT/REASON FOR VISIT   Possible prostatitis    HISTORY OF PRESENT ILLNESS   Mr. Elias is a pleasant 37-year-old gentleman, who presents today with concerns for recurrent prostatitis.  Patient endorses urinary hesitancy, feeling of incomplete emptying, weak urinary stream, and urinary frequency.  He is also endorsing urgency and occasional urge incontinence.  He does note that he has been having discomfort for several months.    Patient has a history of recurrent prostatitis and epididymitis.  Previously he has had Enterococcus.  He also has a penicillin allergy.  Cystoscopy 2017 showed hypospadias with a sensitive prostate.    He was diagnosed with prostatitis in March or 2016.  He was given 4 weeks of ciprofloxacin.  In November 2016 he had left testicular scrotal pain.  Ultrasound imaging in the emergency department at that time showed left-sided epididymal orchitis.  Patient was given 2 weeks of ciprofloxacin to cover urinary pathogens.  He was also given a 10-day course of doxycycline to cover STIs.  He received an injection of Rocephin in the ER.    Patient followed up in urology and had not noted benefit.  He was transitioned to Bactrim instead of ciprofloxacin.  He also had extension of his doxycycline.  His discomfort improved.    In 2017, he had a 12-week course of doxycycline 100 mg twice daily    Patient endorses that his symptoms have been worsening.  He endorses suprapubic discomfort that aches down to his right testicle.  He also endorses perineal discomfort.  He feels like his prostatitis correlates with dental work.    The following portions of the patient's history were reviewed and updated as appropriate: allergies, current medications, past family history, past medical history, past social history, past surgical history, and problem list.     REVIEW OF SYSTEMS   Review of Systems   Constitutional: Negative for chills and fever.   Respiratory: Negative for shortness of  "breath.    Cardiovascular: Negative for chest pain.   Gastrointestinal: Negative for nausea and vomiting.   Genitourinary: Positive for difficulty urinating, frequency and urgency. Negative for dysuria and hematuria.      Per HPI.     Patient Active Problem List   Diagnosis     CARDIOVASCULAR SCREENING; LDL GOAL LESS THAN 160     Health Care Home     History of prostatitis     Syncope     Anxiety      Past Medical History:   Diagnosis Date     Acute tonsillitis     Recurrent, hospitalized 2/05     Anxiety     [facility]     CARDIOVASCULAR SCREENING; LDL GOAL LESS THAN 160      Epididymitis, right      History of prostatitis 3/23/2017     Syncope         Objective      PHYSICAL EXAM   /72   Ht 1.88 m (6' 2\")   Wt 74.8 kg (165 lb)   BMI 21.18 kg/m     Physical Exam  Constitutional:       Appearance: Normal appearance.   HENT:      Head: Normocephalic.   Eyes:      General: No scleral icterus.  Pulmonary:      Effort: Pulmonary effort is normal.   Abdominal:      Tenderness: There is no right CVA tenderness.   Genitourinary:     Comments: Prostate: Approximately 35 g.  Tender.  Patient did yell out in discomfort with prostate examination.  Rectum: Increased rectal tone.  Musculoskeletal:         General: Normal range of motion.   Neurological:      General: No focal deficit present.      Mental Status: He is alert and oriented to person, place, and time.   Psychiatric:         Mood and Affect: Mood normal.         Behavior: Behavior normal.         LABORATORY     Recent Labs   Lab Test 11/22/22  1406 10/08/18  2150   COLOR Yellow Yellow   APPEARANCE Clear Clear   URINEGLC Negative Negative   URINEBILI Negative Negative   URINEKETONE Trace* Negative   SG 1.025 1.027   UBLD Negative Negative   URINEPH 5.5 7.0   PROTEIN Negative Negative   UROBILINOGEN 1.0  --    NITRITE Negative Negative   LEUKEST Negative Negative   RBCU  --  1   WBCU  --  1     TESTING    PVR: 0 mL    Assessment & Plan    1. Prostate pain  "   2. Acute prostatitis      I had the pleasure today of meeting with Mr. Elias to discuss his possible recurrent bacterial prostatitis.  Patient does have a history of prostatitis and epididymitis.  His symptoms and examination are somewhat consistent with prior issues.    -We will plan on sending urinalysis for Ureaplasma mycoplasma culture.  If positive, will treat with culture specific antibiotics.    -Will send you with an ejaculate culture kit.  Complete and drop off.  Then, start doxycyline 100 mg twice daily for 4 weeks.  Will follow culture and may need to change antibiotics.    -In the interim, can do Sitz baths, ice, heat, and schedule NSAIDs for up to 2 weeks.    If persistent issues, may need to consider repeat cystoscopy, MRI of the prostate, pelvic PT, and/or referral to Dr. Taylor.    - Warning signs discussed including fevers, chills, gross hematuria, severe pain that is refractory to medications or uncontrolled nausea and vomiting, which should prompt more urgent evaluation. Patient understands to proceed to the ER should these warning signs occur outside of clinic hours.    Follow up as needed.    Signed by:       Sidra Sweeney PA-C 11/22/2022 4:13 PM

## 2022-11-22 NOTE — LETTER
11/22/2022       RE: Abebe Elias  [address]  [facility] [address]     Dear Colleague,    Thank you for referring your patient, Abebe Elias, to the [facility] [place] at [facility]. Please see a copy of my visit note below.    Subjective       CHIEF COMPLAINT/REASON FOR VISIT   Possible prostatitis    HISTORY OF PRESENT ILLNESS   Mr. Elias is a pleasant 37-year-old gentleman, who presents today with concerns for recurrent prostatitis.  Patient endorses urinary hesitancy, feeling of incomplete emptying, weak urinary stream, and urinary frequency.  He is also endorsing urgency and occasional urge incontinence.  He does note that he has been having discomfort for several months.    Patient has a history of recurrent prostatitis and epididymitis.  Previously he has had Enterococcus.  He also has a penicillin allergy.  Cystoscopy 2017 showed hypospadias with a sensitive prostate.    He was diagnosed with prostatitis in March or 2016.  He was given 4 weeks of ciprofloxacin.  In November 2016 he had left testicular scrotal pain.  Ultrasound imaging in the emergency department at that time showed left-sided epididymal orchitis.  Patient was given 2 weeks of ciprofloxacin to cover urinary pathogens.  He was also given a 10-day course of doxycycline to cover STIs.  He received an injection of Rocephin in the ER.    Patient followed up in urology and had not noted benefit.  He was transitioned to Bactrim instead of ciprofloxacin.  He also had extension of his doxycycline.  His discomfort improved.    In 2017, he had a 12-week course of doxycycline 100 mg twice daily    Patient endorses that his symptoms have been worsening.  He endorses suprapubic discomfort that aches down to his right testicle.  He also endorses perineal discomfort.  He feels like his prostatitis correlates with dental work.    The following portions of the patient's history  "were reviewed and updated as appropriate: allergies, current medications, past family history, past medical history, past social history, past surgical history, and problem list.     REVIEW OF SYSTEMS   Review of Systems   Constitutional: Negative for chills and fever.   Respiratory: Negative for shortness of breath.    Cardiovascular: Negative for chest pain.   Gastrointestinal: Negative for nausea and vomiting.   Genitourinary: Positive for difficulty urinating, frequency and urgency. Negative for dysuria and hematuria.      Per HPI.     Patient Active Problem List   Diagnosis     CARDIOVASCULAR SCREENING; LDL GOAL LESS THAN 160     Health Care Home     History of prostatitis     Syncope     Anxiety      Past Medical History:   Diagnosis Date     Acute tonsillitis     Recurrent, hospitalized 2/05     Anxiety     [facility]     CARDIOVASCULAR SCREENING; LDL GOAL LESS THAN 160      Epididymitis, right      History of prostatitis 3/23/2017     Syncope         Objective       PHYSICAL EXAM   /72   Ht 1.88 m (6' 2\")   Wt 74.8 kg (165 lb)   BMI 21.18 kg/m     Physical Exam  Constitutional:       Appearance: Normal appearance.   HENT:      Head: Normocephalic.   Eyes:      General: No scleral icterus.  Pulmonary:      Effort: Pulmonary effort is normal.   Abdominal:      Tenderness: There is no right CVA tenderness.   Genitourinary:     Comments: Prostate: Approximately 35 g.  Tender.  Patient did yell out in discomfort with prostate examination.  Rectum: Increased rectal tone.  Musculoskeletal:         General: Normal range of motion.   Neurological:      General: No focal deficit present.      Mental Status: He is alert and oriented to person, place, and time.   Psychiatric:         Mood and Affect: Mood normal.         Behavior: Behavior normal.         LABORATORY     Recent Labs   Lab Test 11/22/22  1406 10/08/18  2150   COLOR Yellow Yellow   APPEARANCE Clear Clear   URINEGLC Negative Negative "   URINEBILI Negative Negative   URINEKETONE Trace* Negative   SG 1.025 1.027   UBLD Negative Negative   URINEPH 5.5 7.0   PROTEIN Negative Negative   UROBILINOGEN 1.0  --    NITRITE Negative Negative   LEUKEST Negative Negative   RBCU  --  1   WBCU  --  1     TESTING    PVR: 0 mL    Assessment & Plan    1. Prostate pain    2. Acute prostatitis      I had the pleasure today of meeting with Mr. Elias to discuss his possible recurrent bacterial prostatitis.  Patient does have a history of prostatitis and epididymitis.  His symptoms and examination are somewhat consistent with prior issues.    -We will plan on sending urinalysis for Ureaplasma mycoplasma culture.  If positive, will treat with culture specific antibiotics.    -Will send you with an ejaculate culture kit.  Complete and drop off.  Then, start doxycyline 100 mg twice daily for 4 weeks.  Will follow culture and may need to change antibiotics.    -In the interim, can do Sitz baths, ice, heat, and schedule NSAIDs for up to 2 weeks.    If persistent issues, may need to consider repeat cystoscopy, MRI of the prostate, pelvic PT, and/or referral to Dr. Taylor.    - Warning signs discussed including fevers, chills, gross hematuria, severe pain that is refractory to medications or uncontrolled nausea and vomiting, which should prompt more urgent evaluation. Patient understands to proceed to the ER should these warning signs occur outside of clinic hours.    Follow up as needed.    Signed by:       Sidra Sweeney PA-C 11/22/2022 4:13 PM

## 2022-11-22 NOTE — NURSING NOTE
Chief Complaint   Patient presents with     Prostatitis     Lower abdominal pain that has been getting progressively worse over the past few days, radiating down to right testicle.  Feels like his bladder is full, having some urinary frequency, and weak urinary stream.  Has not had any blood in his urine, does not burn with urination.    Jennifer Fuentes, EMT

## 2022-11-22 NOTE — TELEPHONE ENCOUNTER
Returned phone call to patient and recommended he come in on nurse clinic today to have his bladder scanned to make sure he is emptying his bladder.     Simran Daly LPN

## 2022-11-25 LAB — BACTERIA SMN CULT: NORMAL

## 2022-11-30 LAB — BACTERIA UR CULT: NORMAL

## 2023-02-08 ASSESSMENT — ENCOUNTER SYMPTOMS
FEVER: 0
DYSURIA: 0
DIFFICULTY URINATING: 1
FREQUENCY: 1
NAUSEA: 0
VOMITING: 0
SHORTNESS OF BREATH: 0
HEMATURIA: 0
CHILLS: 0

## 2023-03-07 ENCOUNTER — OFFICE VISIT (OUTPATIENT)
Dept: URGENT CARE | Facility: URGENT CARE | Age: 38
End: 2023-03-07
Payer: COMMERCIAL

## 2023-03-07 ENCOUNTER — ANCILLARY PROCEDURE (OUTPATIENT)
Dept: GENERAL RADIOLOGY | Facility: CLINIC | Age: 38
End: 2023-03-07
Attending: PHYSICIAN ASSISTANT
Payer: COMMERCIAL

## 2023-03-07 VITALS
OXYGEN SATURATION: 100 % | SYSTOLIC BLOOD PRESSURE: 92 MMHG | HEART RATE: 72 BPM | DIASTOLIC BLOOD PRESSURE: 64 MMHG | TEMPERATURE: 97.8 F

## 2023-03-07 DIAGNOSIS — S69.92XA INJURY OF FINGER OF LEFT HAND, INITIAL ENCOUNTER: ICD-10-CM

## 2023-03-07 DIAGNOSIS — S69.92XA INJURY OF FINGER OF LEFT HAND, INITIAL ENCOUNTER: Primary | ICD-10-CM

## 2023-03-07 PROCEDURE — 73140 X-RAY EXAM OF FINGER(S): CPT | Mod: TC | Performed by: RADIOLOGY

## 2023-03-07 PROCEDURE — 99213 OFFICE O/P EST LOW 20 MIN: CPT | Performed by: PHYSICIAN ASSISTANT

## 2023-03-07 RX ORDER — NAPROXEN 500 MG/1
500 TABLET ORAL 2 TIMES DAILY WITH MEALS
Qty: 14 TABLET | Refills: 0 | Status: SHIPPED | OUTPATIENT
Start: 2023-03-07 | End: 2023-03-14

## 2023-03-07 RX ORDER — DEXTROAMPHETAMINE SACCHARATE, AMPHETAMINE ASPARTATE, DEXTROAMPHETAMINE SULFATE AND AMPHETAMINE SULFATE 5; 5; 5; 5 MG/1; MG/1; MG/1; MG/1
TABLET ORAL
COMMUNITY
Start: 2023-02-18

## 2023-03-07 NOTE — PATIENT INSTRUCTIONS
Keep affected area clean.    Apply bacitracin antibiotic ointment available over the counter to affected area until completely healed.      RICE stands for rest, ice, compression, and elevation. Doing these things helps limit pain and swelling after an injury. RICE also helps injuries heal faster. Use RICE for sprains, strains, and severe bruises or bumps. Follow the tips on this handout and begin RICE as soon as possible after an injury.   Rest  Pain is your body s way of telling you to rest an injured area. Whether you have hurt an elbow, hand, foot, or knee, limiting its use will prevent further injury and help you heal.   Ice  Applying ice right after an injury helps prevent swelling and reduce pain. Don t place ice directly on your skin.   Wrap a cold pack or bag of ice in a thin cloth. Place it over the injured area.  Ice for  10 minutes every  3 hours. Don t ice for more than  20 minutes at a time.  Compression  Putting pressure (compression) on an injury helps prevent swelling and provides support.  Wrap the injured area firmly with an elastic bandage. If your hand or foot tingles, becomes discolored, or feels cold to the touch, the bandage may be too tight. Rewrap it more loosely.  If your bandage becomes too loose, rewrap it.  Do not wear an elastic bandage overnight.  Elevation  Keeping an injury elevated helps reduce swelling, pain, and throbbing. Elevation is most effective when the injury is kept elevated higher than the heart.   Call your healthcare provider if you notice any of the following:  Fingers or toes feel numb, are cold to the touch, or change color.  Skin looks shiny or tight.  Pain, swelling, or bruising worsens and is not improved with elevation.  [employer] last reviewed this educational content on 5/1/2018 2000-2021 [employer]. All rights reserved. This information is not intended as a substitute for professional medical care. Always follow your healthcare professional's  instructions.

## 2023-03-07 NOTE — PROGRESS NOTES
Assessment & Plan     Injury of finger of left hand, initial encounter  Inadvertently injured with a drill today.  Fortunately x-ray is negative for acute fractures.  Naproxen is prescribed as needed for pain.  Wound care instructions are given.  Advised to keep affected area clean.  Apply OTC bacitracin ointment on until completely healed.  Follow-up if any worsening symptoms including increasing swelling, purulent drainage, fever etc.... Patient agrees with the plan.  - XR Finger Left G/E 2 Views  - naproxen (NAPROSYN) 500 MG tablet  Dispense: 14 tablet; Refill: 0       Return in about 10 days (around 3/17/2023) for Symptoms failing to improve.    Mary Delarosa PA-C  [facility] [place]    Horace Lomas is a 37 year old male who presents to clinic today for the following health issues:  Chief Complaint   Patient presents with     Finger     Patient is a 37 yr old male who presents with injury to pinky finger on left hand. Patient reports he injured it at home with a drill.      HPI    Patient is presenting to urgent care today following a left fifth finger injury.  Inadvertently sustained an injury to the left fifth finger with a drill.  The drill inadvertently went through his nail at the distal aspect of the finger.  He took Tylenol without significant improvement in his symptoms.      Review of Systems  Constitutional, HEENT, cardiovascular, pulmonary, GI, , musculoskeletal, neuro, skin, endocrine and psych systems are negative, except as otherwise noted.      Objective    BP 92/64 (BP Location: Right arm, Patient Position: Chair, Cuff Size: Adult Regular)   Pulse 72   Temp 97.8  F (36.6  C) (Tympanic)   SpO2 100%   Physical Exam   GENERAL: healthy, alert and no distress  MS: Left fifth finger exam: No gross deformities, swelling or ecchymosis noted.  Puncture wound through the nail of the left fifth finger, range of motion is mildly limited due to pain.  Bleeding is  controlled.    Xray left 5th finger - Reviewed and interpreted by me.  Negative for acute fracture or dislocation.

## 2023-03-30 ENCOUNTER — E-VISIT (OUTPATIENT)
Dept: FAMILY MEDICINE | Facility: CLINIC | Age: 38
End: 2023-03-30
Payer: COMMERCIAL

## 2023-03-30 DIAGNOSIS — R22.30 AXILLARY MASS, UNSPECIFIED LATERALITY: Primary | ICD-10-CM

## 2023-03-30 PROCEDURE — 99207 PR NON-BILLABLE SERV PER CHARTING: CPT | Performed by: INTERNAL MEDICINE

## 2023-03-31 ENCOUNTER — TELEPHONE (OUTPATIENT)
Dept: INTERNAL MEDICINE | Facility: CLINIC | Age: 38
End: 2023-03-31
Payer: COMMERCIAL

## 2023-03-31 NOTE — TELEPHONE ENCOUNTER
Patient sent in an evisit. Dr. Villatoro would like patient triaged.    Questionnaire: General Concern     Question: Please describe your symptoms.  Answer:   There's what feels like a blugded vein in the back side of my armpit.  It moves if I move my arm.  Has gotten painful.  I noticed the vein today but it has been sore and tender for a few days.     Question: Have you had these symptoms before?  Answer:   No     Question: How long have you been having these symptoms?  Answer:   For a few days     Question: Please list any medications you are currently taking for this condition.  Answer:   None     Question: Please describe any probable cause for these symptoms.   Answer:   I don't know.     ~~~~~~~~~~~~~~~~~~~~~~~~~~~~~~~~  Wrap up      Question: Anything else you would like to add?      Answer:   I'm  just trying to figure out if it's an emergency. Or will pass it? does tamanna off seem like axillo-subclavian vein thrombosis but that's off the little reading I didn't and couldn't see how serious it is.

## 2023-04-03 ENCOUNTER — OFFICE VISIT (OUTPATIENT)
Dept: URGENT CARE | Facility: URGENT CARE | Age: 38
End: 2023-04-03
Payer: COMMERCIAL

## 2023-04-03 VITALS
RESPIRATION RATE: 16 BRPM | HEART RATE: 78 BPM | TEMPERATURE: 98.3 F | WEIGHT: 170 LBS | HEIGHT: 74 IN | SYSTOLIC BLOOD PRESSURE: 94 MMHG | OXYGEN SATURATION: 96 % | BODY MASS INDEX: 21.82 KG/M2 | DIASTOLIC BLOOD PRESSURE: 52 MMHG

## 2023-04-03 DIAGNOSIS — M79.621 PAIN, AXILLARY, RIGHT: Primary | ICD-10-CM

## 2023-04-03 PROCEDURE — 99213 OFFICE O/P EST LOW 20 MIN: CPT | Performed by: PHYSICIAN ASSISTANT

## 2023-04-03 NOTE — PATIENT INSTRUCTIONS
Patient was educated on the natural course of condition. He has more pronounced axillary vein without vascular compromise. No masses palpated. Conservative measures discussed including warm compresses and over-the-counter analgesics (Tylenol or Ibuprofen).  See your primary care provider if symptoms worsen or do not improve in 7 days. If symptoms persist, consider ultrasound and/or PT for radiculopathy symptoms. Seek emergency care if you develop fever, severe swelling, or increased redness.

## 2023-04-03 NOTE — PROGRESS NOTES
"URGENT CARE VISIT:    SUBJECTIVE:   Chief Complaint   Patient presents with     Urgent Care     Right Axillary and  shoulder pain. Patient seen in [place] and told heat compresses per patient report. Patient states pain radiates from right umana to shoulder through axillary.       Abebe Elias is a 37 year old male who presents with a chief complaint of right armpit pain. Pain radiates to pectoral and shoulder. Sometimes he feels n/t. Symptoms began 5 day(s) ago, are mild and worsening. No known injury. Pain exacerbated by movement. Relieved by rest.  He treated it initially with no therapy. This is the first time this type of injury has occurred to this patient.     PMH:   Past Medical History:   Diagnosis Date     Acute tonsillitis     Recurrent, hospitalized      [facility]     CARDIOVASCULAR SCREENING; LDL GOAL LESS THAN 160      Epididymitis, right      History of prostatitis 3/23/2017     Syncope      Allergies: Hydrocodone, Penicillins, and Pseudoephedrine   Medications:   Current Outpatient Medications   Medication Sig Dispense Refill     amphetamine-dextroamphetamine (ADDERALL) 20 MG tablet take 1/2 tablet by mouth daily as directed       tiZANidine (ZANAFLEX) 4 MG tablet        venlafaxine (EFFEXOR-XR) 150 MG 24 hr capsule TAKE 1 CAPSULE BY MOUTH EVERY DAY       Social History:   Social History     Tobacco Use     Smoking status: Former     Packs/day: 0.50     Years: 2.00     Pack years: 1.00     Types: Cigarettes     Quit date: 2016     Years since quittin.3     Smokeless tobacco: Former     Tobacco comments:     3/4 of pack daily   Vaping Use     Vaping status: Every Day     Substances: Nicotine   Substance Use Topics     Alcohol use: No     Alcohol/week: 0.0 standard drinks of alcohol       ROS:  Review of systems negative except as stated above.    OBJECTIVE:  BP 94/52   Pulse 78   Temp 98.3  F (36.8  C) (Tympanic)   Resp 16   Ht 1.88 m (6' 2\")   Wt 77.1 kg (170 lb) "   SpO2 96%   BMI 21.83 kg/m    GENERAL APPEARANCE: healthy, alert and no distress  LYMPH: no axillary lymphadenopathy.   MUSCULOSKELETAL: No TTP over axilla, pectoral, or scapula. FROM shoulder. 5/5 strength.   EXTREMITIES: peripheral pulses normal  SKIN: warm to touch. No bruising. No nodules palpated on right axilla.  NEURO: sensation intact       ASSESSMENT:    ICD-10-CM    1. Pain, axillary, right  M79.621           PLAN:  Patient Instructions   Patient was educated on the natural course of condition. He has more pronounced axillary vein without vascular compromise. No masses palpated. Conservative measures discussed including warm compresses and over-the-counter analgesics (Tylenol or Ibuprofen).  See your primary care provider if symptoms worsen or do not improve in 7 days. If symptoms persist, consider ultrasound and/or PT for radiculopathy symptoms. Seek emergency care if you develop fever, severe swelling, or increased redness.     Patient verbalized understanding and is agreeable to plan. The patient was discharged ambulatory and in stable condition.    Mira Vela PA-C on 4/3/2023 at 12:41 PM

## 2023-04-03 NOTE — TELEPHONE ENCOUNTER
RN Triage    Patient Contact    Attempt # 2    Was call answered?  No.  Left message on voicemail with information to call me back.      Namita Godwin RN on 4/3/2023 at 1:59 PM

## 2023-04-05 NOTE — TELEPHONE ENCOUNTER
Patient returning call. Advised that VERN Breaux was calling related to E visit and since he was evaluated in UC he could disregard the messages.     Mike Daly RN [facility]     No

## 2023-04-13 ENCOUNTER — HOSPITAL ENCOUNTER (OUTPATIENT)
Dept: ULTRASOUND IMAGING | Facility: CLINIC | Age: 38
Discharge: HOME | End: 2023-04-13
Attending: NURSE PRACTITIONER | Admitting: NURSE PRACTITIONER
Payer: COMMERCIAL

## 2023-04-13 ENCOUNTER — ANCILLARY PROCEDURE (OUTPATIENT)
Dept: GENERAL RADIOLOGY | Facility: CLINIC | Age: 38
End: 2023-04-13
Attending: NURSE PRACTITIONER
Payer: COMMERCIAL

## 2023-04-13 ENCOUNTER — OFFICE VISIT (OUTPATIENT)
Dept: FAMILY MEDICINE | Facility: CLINIC | Age: 38
End: 2023-04-13
Payer: COMMERCIAL

## 2023-04-13 VITALS
HEIGHT: 74 IN | WEIGHT: 157 LBS | SYSTOLIC BLOOD PRESSURE: 120 MMHG | OXYGEN SATURATION: 100 % | BODY MASS INDEX: 20.15 KG/M2 | DIASTOLIC BLOOD PRESSURE: 76 MMHG | TEMPERATURE: 97.6 F | RESPIRATION RATE: 16 BRPM | HEART RATE: 94 BPM

## 2023-04-13 DIAGNOSIS — R07.81 RIB PAIN: ICD-10-CM

## 2023-04-13 DIAGNOSIS — M79.621 AXILLARY PAIN, RIGHT: ICD-10-CM

## 2023-04-13 DIAGNOSIS — R07.81 RIB PAIN: Primary | ICD-10-CM

## 2023-04-13 PROCEDURE — 72040 X-RAY EXAM NECK SPINE 2-3 VW: CPT | Mod: TC | Performed by: RADIOLOGY

## 2023-04-13 PROCEDURE — 71046 X-RAY EXAM CHEST 2 VIEWS: CPT | Mod: TC | Performed by: RADIOLOGY

## 2023-04-13 PROCEDURE — 99213 OFFICE O/P EST LOW 20 MIN: CPT | Performed by: NURSE PRACTITIONER

## 2023-04-13 PROCEDURE — 93971 EXTREMITY STUDY: CPT | Mod: RT

## 2023-04-13 NOTE — PROGRESS NOTES
Assessment & Plan     Abebe was seen today for er f/u.    Diagnoses and all orders for this visit:    ddx is broad- concern for thoracic outlet syndrome, thrombus, and nerve impingement given quality of pain. Costochondritis considered given reproducible sternal pain, but this does not adequately explain scapula discomfort. Proceed with imaging.      Rib pain  -     XR Chest 2 Views; Future    Axillary pain, right  -     XR Cervical Spine 2/3 Views; Future  -     US Upper Extremity Venous Duplex Right; Future        Aidan Khan RN, FNP Student     I was present with the student who participated in the service and in the documentation of the note, which I have reviewed and verified. The history, reviews of systems, objective data, and assessment/plan were completed by myself.      Pat Shaver, I[facility]   Abebe is a 37 year old, presenting for the following health issues:  ER F/U    Pt describes R axilla pain that radiates to scapula, pectoral, and R edge of sternum. States this has been ongoing for approx 10 days and is gradually worsening. He also noted a largely distended vein that followed the line of his pain from the axilla to scapula that has recently resolved. He additionally describes numb/ting and shooting pain from his R shoulder to his hand. This is mildly improved with repositioning.     He has tried hot packs to scapula with good success and NSAIDS with mild relief. Does require upper body lifting for work. Denies recent injury     He was seen in UC on 3 APR and was told to see his primary for an US.          View : No data to display.              [facility]     ED/ Followup:    Facility:  [facility]  Date of visit: 04/03/2023  Reason for visit: Right axillary - umana- shoulder pain-   Current Status: more sore upper right back- still having pain- vein has gone down in the armpit- getting a bruise on the upper back right  "side      Review of Systems   Constitutional, HEENT, cardiovascular, pulmonary, gi and gu systems are negative, except as otherwise noted.      Objective    /76   Pulse 94   Temp 97.6  F (36.4  C) (Temporal)   Resp 16   Ht 1.88 m (6' 2\")   Wt 71.2 kg (157 lb)   SpO2 100%   BMI 20.16 kg/m    Body mass index is 20.16 kg/m .  Physical Exam   GENERAL: healthy, alert and no distress  NECK: no adenopathy, no asymmetry, masses, or scars and thyroid normal to palpation  RESP: lungs clear to auscultation - no rales, rhonchi or wheezes  CV: regular rate and rhythm, normal S1 S2, no S3 or S4, no murmur, click or rub, no peripheral edema and peripheral pulses strong. Superficial vein easily palpable from r axilla extending to scapula, it is nontender.   MS: no gross musculoskeletal defects noted, mild swelling to tissue over R axilla and scapula. Tenderness to palpation over sternum  INT: some hyperpigmentation of R scapula             "

## 2023-04-14 NOTE — RESULT ENCOUNTER NOTE
Dear Abebe,     Your chest x-ray was normal and reassuring.        Please send a [employer] message or call [phone]  if you have any questions.      IDA Chavarria, CNP  [facility]

## 2023-04-15 ENCOUNTER — MYC MEDICAL ADVICE (OUTPATIENT)
Dept: FAMILY MEDICINE | Facility: CLINIC | Age: 38
End: 2023-04-15
Payer: COMMERCIAL

## 2023-04-15 DIAGNOSIS — M79.621 AXILLARY PAIN, RIGHT: Primary | ICD-10-CM

## 2023-04-17 NOTE — TELEPHONE ENCOUNTER
My chart message sent     Jenny Killian RN, BSN  [facility]

## 2023-04-17 NOTE — RESULT ENCOUNTER NOTE
Dear Abebe,     The ultrasound was overall normal and reassuring, there was no evidence of a bloot clot.        Please send a [employer] message or call [phone]  if you have any questions.      IDA Chavarria, [facility]    If you have further questions about the interpretation of your labs, [email or web address] is a good website to check out for further information.

## 2023-04-17 NOTE — RESULT ENCOUNTER NOTE
Dear Abebe,     The radiologist read your x-ray and noted normal cervical spine alignment and no acute bony abnormalities.     Please send a [employer] message or call [phone]  if you have any questions.      IDA Chavarria, [facility]    If you have further questions about the interpretation of your labs, [email or web address] is a good website to check out for further information.

## 2023-04-18 NOTE — TELEPHONE ENCOUNTER
Please see my chart message below     Please review and advise     Thank you     Jenny Killian RN, BSN  [facility]

## 2023-04-19 ENCOUNTER — TELEPHONE (OUTPATIENT)
Dept: OTHER | Facility: CLINIC | Age: 38
End: 2023-04-19
Payer: COMMERCIAL

## 2023-04-19 NOTE — TELEPHONE ENCOUNTER
Future Appointments   Date Time Provider Department Center   4/21/2023  9:50 AM Anaid Flynn PA-C [facility]

## 2023-04-19 NOTE — TELEPHONE ENCOUNTER
Pt referred to [facility] by Pat Shaver APRN CNP for right axillary pain, prominent right upper extremity vein.    Pt needs to be scheduled for NEW VASCULAR PATIENT consult with Anaid Flynn PA-C.  Will route to scheduling to coordinate an appointment at next available.    Appt note: Ref by Sivakumar Stewart APRN CNP for right axillary pain, prominent right upper extremity vein; RUE vein duplex negative for DVT.    RORO ChandlerN, RN-BC  [facility] [place]

## 2023-04-21 ENCOUNTER — OFFICE VISIT (OUTPATIENT)
Dept: OTHER | Facility: CLINIC | Age: 38
End: 2023-04-21
Attending: PHYSICIAN ASSISTANT
Payer: COMMERCIAL

## 2023-04-21 VITALS
DIASTOLIC BLOOD PRESSURE: 71 MMHG | WEIGHT: 160.2 LBS | OXYGEN SATURATION: 100 % | SYSTOLIC BLOOD PRESSURE: 117 MMHG | BODY MASS INDEX: 20.57 KG/M2 | HEART RATE: 69 BPM

## 2023-04-21 DIAGNOSIS — M79.621 AXILLARY PAIN, RIGHT: ICD-10-CM

## 2023-04-21 DIAGNOSIS — S20.221A: ICD-10-CM

## 2023-04-21 DIAGNOSIS — M79.601 PAIN AND NUMBNESS OF RIGHT UPPER EXTREMITY: Primary | ICD-10-CM

## 2023-04-21 DIAGNOSIS — R20.0 PAIN AND NUMBNESS OF RIGHT UPPER EXTREMITY: Primary | ICD-10-CM

## 2023-04-21 PROCEDURE — G0463 HOSPITAL OUTPT CLINIC VISIT: HCPCS

## 2023-04-21 PROCEDURE — 99204 OFFICE O/P NEW MOD 45 MIN: CPT | Performed by: PHYSICIAN ASSISTANT

## 2023-04-21 NOTE — PATIENT INSTRUCTIONS
Get ultrasounds of your veins and arteries with TOS maneuvers.     2.  Get CT of your chest.     3.  Follow up with us after this is all completed.     4.  Call us with any questions or concerns ([phone]).

## 2023-04-21 NOTE — PROGRESS NOTES
[facility]        Patient is here for a consult.     Pt is currently taking no meds that would impact our treatment plan.    /71 (BP Location: Left arm, Patient Position: Chair, Cuff Size: Adult Regular)   Pulse 69   Wt 160 lb 3.2 oz (72.7 kg)   SpO2 100%   BMI 20.57 kg/m      The provider has been notified that the patient has no concerns.     Questions patient would like addressed today are: N/A.    Refills are needed: N/A    Has homecare services and agency name:  Carolyn Yancey MA

## 2023-04-21 NOTE — PROGRESS NOTES
[facility] INITIAL VASCULAR MEDICINE CONSULT      PRIMARY HEALTH CARE PROVIDER:  Clinic - [facility]      REFERRING HEALTH CARE PROVIDER;  Pat Shaver      REASON FOR CONSULT:  Right axillary and scapula area with enlarged vein and pain.       HPI: Abebe Elias is a 37 year old otherwise healthy male who [place] and laid down to foam roll his right posterior shoulder/scapula area 3/31/23. As he laid in the foam roller, he noted significant pain in this area. It extended into his axilla. He was able to palpate a large vein that he had never seen in that area before and this was tender. His wife urged him to go to Urgent Care. He was offered US but declined. He was directed to treat symptomatically and return if symptoms worsen. He presented again on 4/3/23 to Urgent Care as his symptoms of pain and swelling were worsening. He again was advised to continue conservative measures.     He returned to the [facility] and was seen on 4/13/23 by his PCP clinic as he had developed some bruising over his posterior shoulder/scapula area and in the axilla. The distended vein had resolved, but remained tender and mildly palpable. Venous duplex was undertaken and was negative. Neck and chest x-rays were also normal. He was referred on to Vascular for further evaluation to rule out TOS or other vascular etiology.      Warm compresses do help with the discomfort. NSAIDs help as well. Bruising has pretty much resolved over the past week, but still with ongoing discomfort. 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 He states when he does raise his arms above his head his right arm will get tingly and numb. He will also get shooting pains once in awhile. He states it feels different than his left arm/hand.       PAST MEDICAL HISTORY  Past Medical History:   Diagnosis Date     Acute tonsillitis     Recurrent,  hospitalized      [facility]     CARDIOVASCULAR SCREENING; LDL GOAL LESS THAN 160      Epididymitis, right      History of prostatitis 3/23/2017     Syncope        CURRENT MEDICATIONS  amphetamine-dextroamphetamine (ADDERALL) 20 MG tablet, take 1/2 tablet by mouth daily as directed  tiZANidine (ZANAFLEX) 4 MG tablet,   venlafaxine (EFFEXOR-XR) 150 MG 24 hr capsule, TAKE 1 CAPSULE BY MOUTH EVERY DAY    No current facility-administered medications on file prior to visit.      PAST SURGICAL HISTORY:  Past Surgical History:   Procedure Laterality Date     HAND SURGERY Right 2009    tendon rupture     PENIS SURGERY  1988    hypospadius repair     TONSILLECTOMY & ADENOIDECTOMY  2006       ALLERGIES     Allergies   Allergen Reactions     Hydrocodone Hives and Itching     PN: Also intense itching.     Penicillins Hives     Pseudoephedrine Hives       FAMILY HISTORY  Family History   Problem Relation Age of Onset     Diabetes Maternal Grandmother      Diabetes Maternal Grandfather      Lung Cancer Maternal Grandfather      C.A.D. No family hx of      Hypertension No family hx of        SOCIAL HISTORY  Social History     Socioeconomic History     Marital status:      Spouse name: Toro     Number of children: 3     Years of education: 14     Highest education level: Not on file   Occupational History     Employer: [employer]     Comment: [place] [employer]   Tobacco Use     Smoking status: Former     Packs/day: 0.50     Years: 2.00     Pack years: 1.00     Types: Cigarettes     Quit date: 2016     Years since quittin.3     Smokeless tobacco: Former     Tobacco comments:     3/4 of pack daily   Vaping Use     Vaping status: Every Day     Substances: Nicotine   Substance and Sexual Activity     Alcohol use: No     Alcohol/week: 0.0 standard drinks of alcohol     Drug use: No     Sexual activity: Yes     Partners: Female   Other Topics Concern     Parent/sibling w/ CABG, MI or angioplasty  before 65F 55M? No       ROS:   General: No change in weight, sleep or appetite.  Normal energy.  No fever or chills  Eyes: Negative for vision changes or eye problems  ENT: No problems with ears, nose or throat.  No difficulty swallowing.  Resp: No coughing, wheezing or shortness of breath  CV: No chest pains or palpitations  GI: No nausea, vomiting,  heartburn, abdominal pain, diarrhea, constipation or change in bowel habits  : No urinary frequency or dysuria, bladder or kidney problems  Musculoskeletal: No significant muscle or joint pains  Neurologic: No headaches, numbness, tingling, weakness, problems with balance or coordination  Psychiatric: No problems with anxiety, depression or mental health  Heme/immune/allergy: No history of bleeding or clotting problems or anemia.  No allergies or immune system problems  Endocrine: No history of thyroid disease, diabetes or other endocrine disorders  Skin: No rashes,worrisome lesions or skin problems  Vascular:  No claudication, lifestyle limiting or otherwise; no ischemic rest pain; no non-healing ulcers. No weakness, No loss of sensation      EXAM:  /71 (BP Location: Left arm, Patient Position: Chair, Cuff Size: Adult Regular)   Pulse 69   Wt 160 lb 3.2 oz (72.7 kg)   SpO2 100%   BMI 20.57 kg/m    In general, the patient is a pleasant male in no apparent distress.    HEENT: NC/AT.  PERRLA.  EOMI.  Sclerae white, not injected. Dentition intact.    Neck: No adenopathy.    Lungs: CTA.  No ronchi, wheezes, rales.    Abdomen: Soft, nontender, nondistended.    Extremities: No clubbing, cyanosis, or edema. Palpable radial/ulnar pulse left arm, which diminish some with raising his hands above his head. No wounds. Small palpable cord in right axilla extending to scapula. Very minimal bruising right scapula area. Tender to palpation of cord and muscles in this area. Some larger veins in his arms bilaterally as he is a muscular and fit poornima.       Labs:  LIVER ENZYME  RESULTS:  Lab Results   Component Value Date    AST 10 03/21/2016    ALT 20 03/21/2016       CBC RESULTS:  Lab Results   Component Value Date    WBC 6.1 10/09/2018    RBC 4.37 (L) 10/09/2018    HGB 13.2 (L) 10/09/2018    HCT 39.2 (L) 10/09/2018    MCV 90 10/09/2018    MCH 30.2 10/09/2018    MCHC 33.7 10/09/2018    RDW 12.6 10/09/2018     10/09/2018       BMP RESULTS:  Lab Results   Component Value Date     10/09/2018    POTASSIUM 3.9 10/09/2018    CHLORIDE 110 (H) 10/09/2018    CO2 27 10/09/2018    ANIONGAP 6 10/09/2018    GLC 74 10/09/2018    BUN 18 10/09/2018    CR 1.12 10/09/2018    GFRESTIMATED 76 10/09/2018    GFRESTBLACK >90 10/09/2018    JOSE L 8.2 (L) 10/09/2018        THYROID RESULTS:  Lab Results   Component Value Date    TSH 1.42 10/08/2018       Procedures:   US UPPER EXTREMITY VENOUS DUPLEX RIGHT 4/13/2023 3:18 PM     CLINICAL HISTORY: Right axillary pain with slightly engorged vein,  tender to palpation, further evaluation for thrombus versus vascular  abnormality. Axillary pain, right.     TECHNIQUE: Venous Duplex ultrasound of the right upper extremity with  (when possible) and without compression, augmentation, and duplex.  Color flow and spectral Doppler with waveform analysis performed.     COMPARISON: None.     FINDINGS: Ultrasound includes evaluation of the internal jugular vein,  innominate vein, subclavian vein, axillary vein, and brachial vein.  The superficial cephalic and basilic veins were also evaluated where  seen.      RIGHT: No deep venous thrombosis. No superficial thrombophlebitis.                                                                      IMPRESSION:  1.  No deep venous thrombosis in the right upper extremity.      CHEST TWO VIEWS  4/13/2023 11:15 AM      HISTORY:  Rib pain.     COMPARISON: 2/3/2022.                                                                      IMPRESSION: Negative chest. Lungs clear. No pleural effusions. No  pneumothorax.       CERVICAL  SPINE TWO TO THREE VIEWS   4/13/2023 11:15 AM      HISTORY: Axillary pain, right.     COMPARISON: None.                                                                      IMPRESSION: Joint spaces appear maintained. Soft tissues within normal  limits.          Assessment and Plan:   Right axillary and scapula pain with prominent vein and bruising    -Venous duplex was negative for DVT or SVT.   -Cervical spine x-ray and chest x-ray were normal.   -He does develop numbness/tingling and sometimes pain in his right arm and hand when lifting hands above head at times.   -Rule out thoracic outlet, trauma to veins in axillary/shoulder area. Possible muscle/orthopedic injury.   -Palpable ulnar and radial pulses. Hand is warm.     Recommendations:   -Obtain TOS ultrasound studies, both arterial and venous given his symptoms.   -Obtain CT chest with contrast to evaluate for any underlying bleeding given his bruising and symptoms.   -Return to clinic when imaging is completed.   -Continue symptomatic care.   -Advised that he hold off on repetitive lifting above his head for now until imaging results return.   -Consider orthopedics if all testing is negative.       Anaid Flynn PA-C      50 minutes spent on the date of the encounter doing chart review, history and exam, documentation, and further activities as noted above.

## 2023-04-26 ENCOUNTER — HOSPITAL ENCOUNTER (OUTPATIENT)
Dept: CT IMAGING | Facility: CLINIC | Age: 38
Discharge: HOME | End: 2023-04-26
Attending: PHYSICIAN ASSISTANT
Payer: COMMERCIAL

## 2023-04-26 ENCOUNTER — HOSPITAL ENCOUNTER (OUTPATIENT)
Dept: ULTRASOUND IMAGING | Facility: CLINIC | Age: 38
Discharge: HOME | End: 2023-04-26
Attending: PHYSICIAN ASSISTANT
Payer: COMMERCIAL

## 2023-04-26 DIAGNOSIS — M79.601 PAIN AND NUMBNESS OF RIGHT UPPER EXTREMITY: ICD-10-CM

## 2023-04-26 DIAGNOSIS — R20.0 PAIN AND NUMBNESS OF RIGHT UPPER EXTREMITY: ICD-10-CM

## 2023-04-26 DIAGNOSIS — S20.221A: ICD-10-CM

## 2023-04-26 DIAGNOSIS — M79.621 AXILLARY PAIN, RIGHT: ICD-10-CM

## 2023-04-26 PROCEDURE — 250N000009 HC RX 250: Performed by: PHYSICIAN ASSISTANT

## 2023-04-26 PROCEDURE — 250N000011 HC RX IP 250 OP 636: Performed by: PHYSICIAN ASSISTANT

## 2023-04-26 PROCEDURE — 93971 EXTREMITY STUDY: CPT | Mod: RT

## 2023-04-26 PROCEDURE — 71260 CT THORAX DX C+: CPT

## 2023-04-26 RX ORDER — IOPAMIDOL 755 MG/ML
79 INJECTION, SOLUTION INTRAVASCULAR ONCE
Status: COMPLETED | OUTPATIENT
Start: 2023-04-26 | End: 2023-04-26

## 2023-04-26 RX ADMIN — SODIUM CHLORIDE 63 ML: 9 INJECTION, SOLUTION INTRAVENOUS at 07:59

## 2023-04-26 RX ADMIN — IOPAMIDOL 79 ML: 755 INJECTION, SOLUTION INTRAVENOUS at 07:56

## 2023-04-27 ENCOUNTER — OFFICE VISIT (OUTPATIENT)
Dept: OTHER | Facility: CLINIC | Age: 38
End: 2023-04-27
Attending: PHYSICIAN ASSISTANT
Payer: COMMERCIAL

## 2023-04-27 ENCOUNTER — HOSPITAL ENCOUNTER (OUTPATIENT)
Dept: ULTRASOUND IMAGING | Facility: CLINIC | Age: 38
Discharge: HOME | End: 2023-04-27
Attending: PHYSICIAN ASSISTANT
Payer: COMMERCIAL

## 2023-04-27 VITALS
WEIGHT: 158.8 LBS | DIASTOLIC BLOOD PRESSURE: 69 MMHG | BODY MASS INDEX: 20.38 KG/M2 | HEART RATE: 65 BPM | HEIGHT: 74 IN | OXYGEN SATURATION: 100 % | SYSTOLIC BLOOD PRESSURE: 115 MMHG

## 2023-04-27 DIAGNOSIS — M79.601 PAIN AND NUMBNESS OF RIGHT UPPER EXTREMITY: ICD-10-CM

## 2023-04-27 DIAGNOSIS — R20.0 PAIN AND NUMBNESS OF RIGHT UPPER EXTREMITY: ICD-10-CM

## 2023-04-27 DIAGNOSIS — M79.621 AXILLARY PAIN, RIGHT: Primary | ICD-10-CM

## 2023-04-27 PROCEDURE — 93931 UPPER EXTREMITY STUDY: CPT | Mod: RT

## 2023-04-27 PROCEDURE — G0463 HOSPITAL OUTPT CLINIC VISIT: HCPCS | Mod: 25

## 2023-04-27 PROCEDURE — 99214 OFFICE O/P EST MOD 30 MIN: CPT | Performed by: PHYSICIAN ASSISTANT

## 2023-04-27 NOTE — PROGRESS NOTES
[facility]  VASCULAR MEDICINE FOLLOW-UP VISIT      PRIMARY HEALTH CARE PROVIDER:  Clinic - [facility]    REASON FOR VISIT:  Right axillary and scapula area with enlarged vein and pain, follow-up      HPI: Abebe Elias is a very pleasant 37 year old otherwise healthy male who [place] cabin [place] and laid down to foam roll his right posterior shoulder/scapula area 3/31/23. As he laid in the foam roller, he noted significant pain in this area. It extended into his axilla. He was able to palpate a large vein that he had never seen in that area before and this was tender. His wife urged him to go to Urgent Care. He was offered US but declined. He was directed to treat symptomatically and return if symptoms worsen. He presented again on 4/3/23 to Urgent Care as his symptoms of pain and swelling were worsening. He again was advised to continue conservative measures.      He returned to the [facility] and was seen on 4/13/23 by his PCP clinic as he had developed some bruising over his posterior shoulder/scapula area and in the axilla. The distended vein had resolved, but remained tender and mildly palpable. Venous duplex was undertaken and was negative. Neck and chest x-rays were also normal. He was referred on to Vascular for further evaluation to rule out TOS or other vascular etiology.       Warm compresses do help with the discomfort. NSAIDs help as well. Bruising has pretty much resolved over the past week, but still with ongoing discomfort. 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 He states when he does raise his arms above his head his right arm will get tingly and numb. He will also get shooting pains once in awhile. He states it feels different than his left arm/hand.     He underwent a CT chest given his bruising as well as arterial and venous duplex with provocative maneuvers to rule out TOS or any venous or  arterial issue. He now presents to review these results. He continues to have some pain with different positions of his arms.       PAST MEDICAL HISTORY  Past Medical History:   Diagnosis Date     Acute tonsillitis     Recurrent, hospitalized      [facility]     CARDIOVASCULAR SCREENING; LDL GOAL LESS THAN 160      Epididymitis, right      History of prostatitis 3/23/2017     Syncope        PAST SURGICAL HISTORY:  Past Surgical History:   Procedure Laterality Date     HAND SURGERY Right 2009    tendon rupture     PENIS SURGERY  1988    hypospadius repair     TONSILLECTOMY & ADENOIDECTOMY  2006         CURRENT MEDICATIONS  amphetamine-dextroamphetamine (ADDERALL) 20 MG tablet, take 1/2 tablet by mouth daily as directed  tiZANidine (ZANAFLEX) 4 MG tablet,   venlafaxine (EFFEXOR-XR) 150 MG 24 hr capsule, TAKE 1 CAPSULE BY MOUTH EVERY DAY    No current facility-administered medications on file prior to visit.      ALLERGIES     Allergies   Allergen Reactions     Hydrocodone Hives and Itching     PN: Also intense itching.     Penicillins Hives     Pseudoephedrine Hives       FAMILY HISTORY  Family History   Problem Relation Age of Onset     Diabetes Maternal Grandmother      Diabetes Maternal Grandfather      Lung Cancer Maternal Grandfather      C.A.D. No family hx of      Hypertension No family hx of        SOCIAL HISTORY  Social History     Socioeconomic History     Marital status:      Spouse name: Toro     Number of children: 3     Years of education: 14     Highest education level: Not on file   Occupational History     Employer: [employer]     Comment: [place] [employer]   Tobacco Use     Smoking status: Every Day     Packs/day: 0.50     Years: 2.00     Pack years: 1.00     Types: Cigarettes, Other     Last attempt to quit: 2016     Years since quittin.3     Smokeless tobacco: Former     Tobacco comments:     3/4 of pack daily   Vaping Use     Vaping status: Every Day      "Substances: Nicotine   Substance and Sexual Activity     Alcohol use: No     Alcohol/week: 0.0 standard drinks of alcohol     Drug use: No     Sexual activity: Yes     Partners: Female   Other Topics Concern     Parent/sibling w/ CABG, MI or angioplasty before 65F 55M? No       ROS:   Complete ROS negative other than what is stated in HPI.     EXAM:  /69 (BP Location: Right arm, Patient Position: Chair, Cuff Size: Adult Regular)   Pulse 65   Ht 6' 2\" (1.88 m)   Wt 158 lb 12.8 oz (72 kg)   SpO2 100%   BMI 20.39 kg/m    In general, the patient is a pleasant male in no apparent distress.    HEENT: NC/AT.  PERRLA.  EOMI.  Sclerae white, not injected. Dentition intact.    Neck: No adenopathy.    Lungs: CTA.  No ronchi, wheezes, rales.    Abdomen: Soft, nontender, nondistended.    Extremities: No clubbing, cyanosis, or edema. Palpable radial/ulnar pulses. Tenderness along scapula. Some larger veins in his arms bilaterally as he is a muscular and fit poornima.     Labs:    LIVER ENZYME RESULTS:  Lab Results   Component Value Date    AST 10 03/21/2016    ALT 20 03/21/2016       CBC RESULTS:  Lab Results   Component Value Date    WBC 6.1 10/09/2018    RBC 4.37 (L) 10/09/2018    HGB 13.2 (L) 10/09/2018    HCT 39.2 (L) 10/09/2018    MCV 90 10/09/2018    MCH 30.2 10/09/2018    MCHC 33.7 10/09/2018    RDW 12.6 10/09/2018     10/09/2018       BMP RESULTS:  Lab Results   Component Value Date     10/09/2018    POTASSIUM 3.9 10/09/2018    CHLORIDE 110 (H) 10/09/2018    CO2 27 10/09/2018    ANIONGAP 6 10/09/2018    GLC 74 10/09/2018    BUN 18 10/09/2018    CR 1.12 10/09/2018    GFRESTIMATED 76 10/09/2018    GFRESTBLACK >90 10/09/2018    JOSE L 8.2 (L) 10/09/2018        THYROID RESULTS:  Lab Results   Component Value Date    TSH 1.42 10/08/2018         Procedures:   US UPPER EXTREMITY ARTERIAL DUPLEX RIGHT 4/27/2023 8:19 AM     HISTORY: 37-year-old patient with pain and numbness in the right upper  extremity. Request " made for evaluation of thoracic outlet syndrome,  arterial.     COMPARISON: None     TECHNIQUE: Color Doppler and spectral waveform analysis obtained  throughout the right subclavian artery in neutral and multiple  provocative positions. In neutral position, velocity is 95/1 cm/second  and diameter of 8.4 mm. In provocative positions, systolic velocities  range from 110-140 cm/second and diameters range from 7.9-10.1 mm. No  significant difference. Waveforms are triphasic.                                                                      IMPRESSION: Patent right subclavian artery without suggestion of  extrinsic compression.      US UPPER EXTREMITY VENOUS DUPLEX RIGHT  4/26/2023 9:23 AM      HISTORY: Numbness and pain and enlarged veins in right arm when  raising arms. Pain and numbness of right upper extremity.     COMPARISON: None.     TECHNIQUE: Evaluation of the right subclavian vein in neutral position  and multiple provocative positions.     FINDINGS: The right subclavian vein is 20 cm/second and 14.2 mm in  neutral position. With multiple provocative positions, velocities are  relatively unchanged ranging from 7-20 cm/sec and diameters ranging  from 6.1-14.4 mm.                                                                      IMPRESSION: Patent right subclavian vein in neutral and provocative  positions. No suggestion of extrinsic compression.       CT CHEST W CONTRAST 4/26/2023 8:08 AM     CLINICAL HISTORY: shoulder pain with bruising on posterior back,  evaluate for hematoma in right axillary and scapula area.; Axillary  pain, right; Pain and numbness of right upper extremity; Pain and  numbness of right upper extremity; Superficial bruising of back,  right, initial encounter  TECHNIQUE: CT chest with IV contrast. Multiplanar reformats were  obtained. Dose reduction techniques were used.     CONTRAST: 79 mL Isovue-370     COMPARISON: Chest x-ray 4/13/2023     FINDINGS:   LUNGS AND PLEURA: A 3 mm  nodule in the subpleural right upper lobe  (series 5, image 124). Otherwise the lungs are clear. No infiltrate,  pleural effusion or pneumothorax.     MEDIASTINUM/AXILLAE: No lymphadenopathy. No thoracic aneurysm. No  coronary artery calcium patients.     UPPER ABDOMEN: No significant finding.     MUSCULOSKELETAL: No hematoma about the right scapula or in the right  axillary region. No fractures. No suspicious lesions of the bones.                                                                      IMPRESSION:   1.  No right back, axillary or scapular region hematoma. No traumatic  injury in the chest.  2.  A 3 mm right upper lobe nodule, likely benign intrapulmonary lymph  node. See suggested follow-up guidelines.      Assessment and Plan:     Right axillary and scapula pain with prominent vein and bruising     -Venous duplex was negative for DVT or SVT.   -Cervical spine x-ray and chest x-ray were normal.   -He does develop numbness/tingling and sometimes pain in his right arm and hand when lifting hands above head at times.   -TOS studies negative. CT chest negative for any bleed given bruising. Likely musculoskeletal/orthopedic injury.   -Palpable ulnar and radial pulses. Hand is warm.      Recommendations:   -Continue symptomatic care.   -Consider orthopedics if symptoms don't improve.   -Follow-up with Vascular Medicine PRN.       Tobacco use    -Advised smoking cessation.       Anaid Flynn PA-C      30 minutes spent on the date of the encounter doing chart review, history and exam, documentation, and further activities as noted above.

## 2023-04-27 NOTE — PROGRESS NOTES
"Patient is here to discuss follow up    /69 (BP Location: Right arm, Patient Position: Chair, Cuff Size: Adult Regular)   Pulse 65   Ht 6' 2\" (1.88 m)   Wt 158 lb 12.8 oz (72 kg)   SpO2 100%   BMI 20.39 kg/m      Questions patient would like addressed today are: N/A.    Refills are needed: No    Has homecare services and agency name:  [facility]    "

## 2023-06-02 ENCOUNTER — HEALTH MAINTENANCE LETTER (OUTPATIENT)
Age: 38
End: 2023-06-02

## 2023-07-25 NOTE — PROGRESS NOTES
"  SUBJECTIVE:                                                    Abebe Elias is a 31 year old male who presents to clinic today for the following health issues:    Sinus congestion and ear pain x 4 days.    HISTORY    He has head congestion. His ears are particularly bothering with pain intermittently, pressure, dullness. No ST. Slight cough.    He had recent resistant prostatitis but it finally responded to antibiotics, Macrobid, so now asymptomatic.    Patient Active Problem List   Diagnosis     CARDIOVASCULAR SCREENING; LDL GOAL LESS THAN 160     Health Care Home       REVIEW OF SYSTEMS    No fever  No SOB  No dysuria      Past Medical History:   Diagnosis Date     Acute tonsillitis     Recurrent, hospitalized 2/05       EXAM  /70 (BP Location: Left arm, Patient Position: Chair, Cuff Size: Adult Large)  Pulse 68  Temp 98.8  F (37.1  C) (Oral)  Ht 6' 2\" (1.88 m)  Wt 172 lb 14.4 oz (78.4 kg)  SpO2 100%  BMI 22.2 kg/m2    TM's: nl  Phar: no redness, absent tonsils  Neck: neg  Chest: cl      (J06.9) Upper respiratory tract infection, unspecified type  (primary encounter diagnosis)  Comment:   Plan: cephALEXin (KEFLEX) 500 MG capsule            (H69.83) Dysfunction of eustachian tube, bilateral  Comment:   Plan:     (Z87.438) History of prostatitis  Comment:   Plan:       Take prescribed medication as directed.    Try generic Claritin D, Allegra D or Zyrtec D. Ask Pharmacist for these.            " No.

## 2024-06-16 ENCOUNTER — HEALTH MAINTENANCE LETTER (OUTPATIENT)
Age: 39
End: 2024-06-16

## 2025-06-21 ENCOUNTER — HEALTH MAINTENANCE LETTER (OUTPATIENT)
Age: 40
End: 2025-06-21